# Patient Record
Sex: FEMALE | Race: WHITE | HISPANIC OR LATINO | Employment: FULL TIME | URBAN - METROPOLITAN AREA
[De-identification: names, ages, dates, MRNs, and addresses within clinical notes are randomized per-mention and may not be internally consistent; named-entity substitution may affect disease eponyms.]

---

## 2020-06-26 ENCOUNTER — OCCUPATIONAL HEALTH (OUTPATIENT)
Dept: URGENT CARE | Facility: CLINIC | Age: 30
End: 2020-06-26

## 2020-06-26 DIAGNOSIS — Z02.83 ENCOUNTER FOR DRUG SCREENING: Primary | ICD-10-CM

## 2020-06-26 LAB
CTP QC/QA: YES
POC 10 PANEL DRUG SCREEN: NEGATIVE

## 2020-06-26 PROCEDURE — 80305 POCT RAPID DRUG SCREEN 10 PANEL: ICD-10-PCS | Mod: QW,S$GLB,, | Performed by: PREVENTIVE MEDICINE

## 2020-06-26 PROCEDURE — 80305 DRUG TEST PRSMV DIR OPT OBS: CPT | Mod: QW,S$GLB,, | Performed by: PREVENTIVE MEDICINE

## 2022-06-13 ENCOUNTER — HOSPITAL ENCOUNTER (EMERGENCY)
Facility: HOSPITAL | Age: 32
Discharge: HOME | End: 2022-06-14
Attending: EMERGENCY MEDICINE
Payer: COMMERCIAL

## 2022-06-13 DIAGNOSIS — M25.559 HIP PAIN: ICD-10-CM

## 2022-06-13 DIAGNOSIS — M87.052 AVASCULAR NECROSIS OF BONE OF HIP, LEFT: Primary | ICD-10-CM

## 2022-06-13 LAB
B-HCG UR QL: NEGATIVE
CTP QC/QA: YES

## 2022-06-13 PROCEDURE — 99284 EMERGENCY DEPT VISIT MOD MDM: CPT | Mod: 25

## 2022-06-13 PROCEDURE — 81025 URINE PREGNANCY TEST: CPT | Performed by: NURSE PRACTITIONER

## 2022-06-13 PROCEDURE — 25000003 PHARM REV CODE 250: Performed by: EMERGENCY MEDICINE

## 2022-06-13 PROCEDURE — 99284 PR EMERGENCY DEPT VISIT,LEVEL IV: ICD-10-PCS | Mod: ,,, | Performed by: EMERGENCY MEDICINE

## 2022-06-13 PROCEDURE — 99284 EMERGENCY DEPT VISIT MOD MDM: CPT | Mod: ,,, | Performed by: EMERGENCY MEDICINE

## 2022-06-13 RX ORDER — GABAPENTIN 300 MG/1
300 CAPSULE ORAL
Status: COMPLETED | OUTPATIENT
Start: 2022-06-13 | End: 2022-06-13

## 2022-06-13 RX ORDER — OXYCODONE HYDROCHLORIDE 5 MG/1
5 TABLET ORAL
Status: COMPLETED | OUTPATIENT
Start: 2022-06-13 | End: 2022-06-13

## 2022-06-13 RX ADMIN — OXYCODONE 5 MG: 5 TABLET ORAL at 11:06

## 2022-06-13 RX ADMIN — GABAPENTIN 300 MG: 300 CAPSULE ORAL at 11:06

## 2022-06-13 NOTE — Clinical Note
"Amalia"David Hayes was seen and treated in our emergency department on 6/13/2022.  She may return to work on 06/21/2022.       If you have any questions or concerns, please don't hesitate to call.      Donna Vásquez MD"

## 2022-06-14 VITALS
DIASTOLIC BLOOD PRESSURE: 93 MMHG | OXYGEN SATURATION: 100 % | TEMPERATURE: 98 F | SYSTOLIC BLOOD PRESSURE: 172 MMHG | HEART RATE: 81 BPM | RESPIRATION RATE: 16 BRPM

## 2022-06-14 PROCEDURE — 25000003 PHARM REV CODE 250: Performed by: EMERGENCY MEDICINE

## 2022-06-14 RX ORDER — IBUPROFEN 600 MG/1
600 TABLET ORAL EVERY 6 HOURS PRN
Qty: 20 TABLET | Refills: 0 | Status: SHIPPED | OUTPATIENT
Start: 2022-06-14 | End: 2022-06-19

## 2022-06-14 RX ORDER — IBUPROFEN 600 MG/1
600 TABLET ORAL
Status: COMPLETED | OUTPATIENT
Start: 2022-06-14 | End: 2022-06-14

## 2022-06-14 RX ORDER — OXYCODONE HYDROCHLORIDE 5 MG/1
5 TABLET ORAL
Status: COMPLETED | OUTPATIENT
Start: 2022-06-14 | End: 2022-06-14

## 2022-06-14 RX ORDER — HYDROCODONE BITARTRATE AND ACETAMINOPHEN 5; 325 MG/1; MG/1
1 TABLET ORAL EVERY 6 HOURS PRN
Qty: 12 TABLET | Refills: 0 | Status: SHIPPED | OUTPATIENT
Start: 2022-06-14 | End: 2022-06-17

## 2022-06-14 RX ADMIN — IBUPROFEN 600 MG: 600 TABLET ORAL at 01:06

## 2022-06-14 RX ADMIN — OXYCODONE 5 MG: 5 TABLET ORAL at 01:06

## 2022-06-14 NOTE — ED NOTES
Bed: [facility]  Expected date:   Expected time:   Means of arrival:   Comments:  michelle

## 2022-06-14 NOTE — ED PROVIDER NOTES
Source of History:  Patient  Chart    Chief complaint:  Back Pain (Pt reporting lower back pain that began on Saturday, but has occurred for a couple of months. Pt recently had xrays, and chriopractic appts, and xrays were normal according to pt. )      HPI:  Amalia Hayes is a 31 y.o. female with history of hip surgery as a child presenting to emergency department with complaint of back pain.    Patient states that she has had ongoing pain in her back and hip for approximately 1 year.  She states that she has been seeing a chiropractor, and they have done x-rays of the hip.  She states that despite chiropractor appointments, and physical therapy, she has had ongoing severe pain which does not improved with ibuprofen.    She is able to ambulate with difficulty, more due to pain and weakness.  She does complain of some numbness in the left foot.  No fevers.  No history of IV drug use.  No urinary or stool incontinence.  No saddle anesthesia.    ROS: As per HPI and below:  Review of Systems   Constitutional: Negative for fever.   HENT: Negative for sore throat.    Eyes: Negative for double vision.   Respiratory: Negative for cough and shortness of breath.    Cardiovascular: Negative for chest pain.   Gastrointestinal: Negative for abdominal pain and vomiting.   Genitourinary: Negative for dysuria.   Musculoskeletal: Positive for back pain and joint pain. Negative for falls.   Skin: Negative for rash.   Neurological: Positive for sensory change. Negative for headaches.       Review of patient's allergies indicates:  No Known Allergies    No current facility-administered medications on file prior to encounter.     Current Outpatient Medications on File Prior to Encounter   Medication Sig Dispense Refill    azithromycin (Z-ROSA) 250 MG tablet Take 1 tablet (250 mg total) by mouth once daily. 1 pack, take as directed 6 tablet 0    ergocalciferol (ERGOCALCIFEROL) 50,000 unit Cap Take 1 capsule (50,000 Units total) by  mouth every 7 days. 12 capsule 3    fluticasone (FLONASE) 50 mcg/actuation nasal spray 1 spray (50 mcg total) by Each Nare route once daily. 16 g 1    loratadine (CLARITIN) 10 mg tablet Take 1 tablet (10 mg total) by mouth once daily. 30 tablet 0    topiramate (TOPAMAX) 25 MG tablet Take 1 tablet (25 mg total) by mouth every evening. 30 tablet 1       PMH:  As per HPI and below:  No past medical history on file.  No past surgical history on file.    Social History     Socioeconomic History    Marital status: Single       No family history on file.    Physical Exam:      Vitals:    06/14/22 0134   BP: (!) 172/93   Pulse: 81   Resp: 16   Temp:      Gen:  Tearful, uncomfortable, nontoxic.  Mental Status:  Alert and oriented .  Appropriate, conversant.  Skin: Warm, dry. No rashes seen.  Eyes: No conjunctival injection.  Pulm: CTAB. No increased work of breathing.  No significant tachypnea.  No audible stridor or wheezing.  No conversational dyspnea.    CV: Regular rate. Regular rhythm.   Abd: Soft.  Not distended.  Nontender.   MSK:  Diminished range of motion of the left hip.  Strength exam limited in the left leg secondary to pain.  No tenderness to palpation of the lumbar spine, sacrum.  Neuro: Awake. Speech normal. No focal neuro deficit observed.    Laboratory Studies:  Labs Reviewed   POCT URINE PREGNANCY       X-rays (independently interpreted by me):  Severe degenerative changes of the left hip.  No fracture or dislocation.    Chart reviewed.     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    No acute fractures evident.      Electronically signed by: José Soto  Date:    06/14/2022  Time:    00:36              Narrative:    EXAMINATION:  XR HIP WITH PELVIS WHEN PERFORMED, 2 OR 3 VIEWS LEFT    CLINICAL HISTORY:  Pain in unspecified hip    TECHNIQUE:  AP view of the pelvis and frog leg lateral view of the left hip were performed.    COMPARISON:  None    FINDINGS:  Orthopedic screws in the left iliac and ileum are noted.  Marked degenerative changes in the left femoroacetabular joint with subchondral cystic formation and flattening of the left femoral head suggesting stage III AVN.  Remodeling of the acetabular roof with osteoarthritis changes are noted.  The remainder of the pelvic ring and left hip appear intact.  The lower lumbar spine appears unremarkable.  No discrete acute fracture is evident.                                Medications Given:  Medications   oxyCODONE immediate release tablet 5 mg (5 mg Oral Given 6/13/22 2311)   gabapentin capsule 300 mg (300 mg Oral Given 6/13/22 2311)   oxyCODONE immediate release tablet 5 mg (5 mg Oral Given 6/14/22 0128)   ibuprofen tablet 600 mg (600 mg Oral Given 6/14/22 0128)     MDM:    31 y.o. female with complaint of 1 year of back/hip pain.  Pain is unimproved after chiropractic treatments and physical therapy.  No clear evidence of cauda equina syndrome given her lack of saddle anesthesia, urinary or stool incontinence, leg weakness.  She has some numbness of that leg which is subacute.    X-ray demonstrates severe degenerative changes of the left hip, for which she will need orthopedic surgery follow-up.  Pain improved here with medications.  Referral placed for Ortho.  Discharged home in stable condition.  Return precautions discussed at bedside.    Diagnostic Impression:    1. Avascular necrosis of bone of hip, left    2. Hip pain    3. Hip pain         ED Disposition Condition    Discharge Stable        ED Prescriptions     Medication Sig Dispense Start Date End Date Auth. Provider    ibuprofen (ADVIL,MOTRIN) 600 MG tablet Take 1 tablet (600 mg total) by mouth every 6  (six) hours as needed for Pain. 20 tablet 2022 Donna Vásquez MD    HYDROcodone-acetaminophen (NORCO) 5-325 mg per tablet () Take 1 tablet by mouth every 6 (six) hours as needed for Pain. 12 tablet 2022 Donna Vásquez MD        Follow-up Information     Follow up With Specialties Details Why Contact Info Additional Information    Elian Collier MD Family Medicine Schedule an appointment as soon as possible for a visit   [address] [facility] [address] [phone]       [facility] Schedule an appointment as soon as possible for a visit   [address]  [facility] [address] [phone] [facility] - Main Building, 5th Floor Please park in [address] and take Atrium elevator          Patient understands the plan and is in agreement, verbalized understanding, questions answered    Donna Vásquez MD  Emergency Medicine       Donna Vásquez MD  [phone]

## 2022-06-14 NOTE — DISCHARGE INSTRUCTIONS
Diagnosis: Avascular necrosis of the hip    A referral was placed for Orthopedic surgery.  If they do not call you to set up a follow-up appointment, please call them.    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      No acute fractures evident.         Electronically signed by: José Soto   Date:    06/14/2022   Time:    00:36          Treatments you had today:   Medications   oxyCODONE immediate release tablet 5 mg (5 mg Oral Given 6/13/22 2311)   gabapentin capsule 300 mg (300 mg Oral Given 6/13/22 2311)       Follow-Up Plan:  - Follow-up with primary care doctor within 3 - 5 days  - Additional testing and/or evaluation as directed by your primary doctor  - Follow up with orthopedic surgery    Take ibuprofen (also called Advil, Motrin) for your pain. This medicine is available over-the-counter in 200 mg tablets.  - You may take 600 mg every 6 hours, or 800 mg every 8 hours as needed   - Do not take more than this amount, as it can cause kidney problems, bleeding in your stomach, and other serious problems.   - Do not also take naproxen (Aleve) at the same time or on the same day  - If you have heart problems or uncontrolled high blood pressure, you should not take ibuprofen for more than 3 days without discussing with your doctor    If your pain is not controlled with ibuprofen, you may also take Norco (acetaminophen-hydrocodone).  - Take this medication only when needed for breakthrough pain.   - Do not take more than the prescribed amount to control your pain.  - Do not operate machinery, drive, exercise, perform caregiving, make important decisions or perform important tasks while taking Norco. It can make you drowsy or forgetful.  - Norco is addictive in as little as 3 days, so take only as  needed.  - Norco can dangerously slow or stop your breathing if you take too much, or if you combine it with alcohol or sedating medicines (including Xanex, Ativan) or illegal drugs.   - Norco can make you constipated (hard to poop), so take fiber supplements and a stool softener while taking this medicine.   - This medication contains acetaminophen (Tylenol). Each pill has 325 mg of acetaminophen. Do not take more than 4,000 mg of acetaminophen within 24 hours as this may lead to liver damage.'  Return to the Emergency Department for symptoms including but not limited to: worsening symptoms, shortness of breath or chest pain, vomiting with inability to hold down fluids, fevers greater than 100.4°F, passing out/fainting/unconsciousness, or other concerning symptoms.

## 2022-06-14 NOTE — FIRST PROVIDER EVALUATION
Emergency Department TeleTriage Encounter Note      CHIEF COMPLAINT    Chief Complaint   Patient presents with    Back Pain     Pt reporting lower back pain that began on Saturday, but has occurred for a couple of months. Pt recently had xrays, and chriopractic appts, and xrays were normal according to pt.        VITAL SIGNS   Initial Vitals [06/13/22 2119]   BP Pulse Resp Temp SpO2   (!) 166/90 93 18 98 °F (36.7 °C) 98 %      MAP       --            ALLERGIES    Review of patient's allergies indicates:  No Known Allergies    PROVIDER TRIAGE NOTE  This is a teletriage evaluation of a 31 y.o. female presenting to the ED with c/o back pain x 1 yr. Now with significant increase in pain since sat.   Denies loss of bowel/ bladder control.      ROS: (-) fever, (-) rash  PE:  NAD    Initial orders will be placed and care will be transferred to an alternate provider when patient is roomed for a full evaluation. Any additional orders and the final disposition will be determined by that provider.         ORDERS  Labs Reviewed - No data to display    ED Orders (720h ago, onward)    Start Ordered     Status Ordering Provider    06/13/22 2203 06/13/22 2202  POCT urine pregnancy  Once         Ordered YUNIER RAMIRES            Virtual Visit Note: The provider triage portion of this emergency department evaluation and documentation was performed via "[employer] a HIPAA-compliant telemedicine application, in concert with a tele-presenter in the room. A face to face patient evaluation with one of my colleagues will occur once the patient is placed in an emergency department room.      DISCLAIMER: This note was prepared with [employer] voice recognition transcription software. Garbled syntax, mangled pronouns, and other bizarre constructions may be attributed to that software system.

## 2022-08-25 ENCOUNTER — TELEPHONE (OUTPATIENT)
Dept: INTERNAL MEDICINE | Facility: CLINIC | Age: 32
End: 2022-08-25
Payer: COMMERCIAL

## 2022-08-25 ENCOUNTER — OFFICE VISIT (OUTPATIENT)
Dept: INTERNAL MEDICINE | Facility: CLINIC | Age: 32
End: 2022-08-25
Payer: COMMERCIAL

## 2022-08-25 ENCOUNTER — LAB VISIT (OUTPATIENT)
Dept: LAB | Facility: HOSPITAL | Age: 32
End: 2022-08-25
Attending: INTERNAL MEDICINE
Payer: COMMERCIAL

## 2022-08-25 VITALS
WEIGHT: 247.44 LBS | OXYGEN SATURATION: 98 % | HEART RATE: 82 BPM | TEMPERATURE: 98 F | SYSTOLIC BLOOD PRESSURE: 120 MMHG | DIASTOLIC BLOOD PRESSURE: 76 MMHG | HEIGHT: 63 IN | BODY MASS INDEX: 43.84 KG/M2

## 2022-08-25 DIAGNOSIS — J30.9 ALLERGIC RHINITIS, UNSPECIFIED SEASONALITY, UNSPECIFIED TRIGGER: ICD-10-CM

## 2022-08-25 DIAGNOSIS — Z11.59 SCREENING FOR VIRAL DISEASE: ICD-10-CM

## 2022-08-25 DIAGNOSIS — Z00.00 ANNUAL PHYSICAL EXAM: Primary | ICD-10-CM

## 2022-08-25 DIAGNOSIS — Z11.4 SCREENING FOR HIV (HUMAN IMMUNODEFICIENCY VIRUS): ICD-10-CM

## 2022-08-25 DIAGNOSIS — Z23 NEED FOR VACCINATION: ICD-10-CM

## 2022-08-25 DIAGNOSIS — M87.052 AVASCULAR NECROSIS OF LEFT FEMUR: ICD-10-CM

## 2022-08-25 DIAGNOSIS — M87.052 AVASCULAR NECROSIS OF LEFT FEMUR: Primary | ICD-10-CM

## 2022-08-25 DIAGNOSIS — Z00.00 ANNUAL PHYSICAL EXAM: ICD-10-CM

## 2022-08-25 LAB
ALBUMIN SERPL BCP-MCNC: 4 G/DL (ref 3.5–5.2)
ALP SERPL-CCNC: 59 U/L (ref 55–135)
ALT SERPL W/O P-5'-P-CCNC: 18 U/L (ref 10–44)
ANION GAP SERPL CALC-SCNC: 10 MMOL/L (ref 8–16)
AST SERPL-CCNC: 15 U/L (ref 10–40)
BASOPHILS # BLD AUTO: 0.06 K/UL (ref 0–0.2)
BASOPHILS NFR BLD: 0.7 % (ref 0–1.9)
BILIRUB SERPL-MCNC: 0.5 MG/DL (ref 0.1–1)
BUN SERPL-MCNC: 14 MG/DL (ref 6–20)
CALCIUM SERPL-MCNC: 9.9 MG/DL (ref 8.7–10.5)
CHLORIDE SERPL-SCNC: 109 MMOL/L (ref 95–110)
CHOLEST SERPL-MCNC: 161 MG/DL (ref 120–199)
CHOLEST/HDLC SERPL: 4.2 {RATIO} (ref 2–5)
CO2 SERPL-SCNC: 26 MMOL/L (ref 23–29)
CREAT SERPL-MCNC: 0.8 MG/DL (ref 0.5–1.4)
DIFFERENTIAL METHOD: ABNORMAL
EOSINOPHIL # BLD AUTO: 0.1 K/UL (ref 0–0.5)
EOSINOPHIL NFR BLD: 1.2 % (ref 0–8)
ERYTHROCYTE [DISTWIDTH] IN BLOOD BY AUTOMATED COUNT: 12.9 % (ref 11.5–14.5)
EST. GFR  (NO RACE VARIABLE): >60 ML/MIN/1.73 M^2
GLUCOSE SERPL-MCNC: 109 MG/DL (ref 70–110)
HCT VFR BLD AUTO: 44.2 % (ref 37–48.5)
HDLC SERPL-MCNC: 38 MG/DL (ref 40–75)
HDLC SERPL: 23.6 % (ref 20–50)
HGB BLD-MCNC: 13.6 G/DL (ref 12–16)
IMM GRANULOCYTES # BLD AUTO: 0.03 K/UL (ref 0–0.04)
IMM GRANULOCYTES NFR BLD AUTO: 0.3 % (ref 0–0.5)
LDLC SERPL CALC-MCNC: 106 MG/DL (ref 63–159)
LYMPHOCYTES # BLD AUTO: 3.1 K/UL (ref 1–4.8)
LYMPHOCYTES NFR BLD: 34.7 % (ref 18–48)
MCH RBC QN AUTO: 27.9 PG (ref 27–31)
MCHC RBC AUTO-ENTMCNC: 30.8 G/DL (ref 32–36)
MCV RBC AUTO: 91 FL (ref 82–98)
MONOCYTES # BLD AUTO: 0.5 K/UL (ref 0.3–1)
MONOCYTES NFR BLD: 5.2 % (ref 4–15)
NEUTROPHILS # BLD AUTO: 5.2 K/UL (ref 1.8–7.7)
NEUTROPHILS NFR BLD: 57.9 % (ref 38–73)
NONHDLC SERPL-MCNC: 123 MG/DL
NRBC BLD-RTO: 0 /100 WBC
PLATELET # BLD AUTO: 368 K/UL (ref 150–450)
PMV BLD AUTO: 11.1 FL (ref 9.2–12.9)
POTASSIUM SERPL-SCNC: 4.1 MMOL/L (ref 3.5–5.1)
PROT SERPL-MCNC: 7.6 G/DL (ref 6–8.4)
RBC # BLD AUTO: 4.87 M/UL (ref 4–5.4)
SODIUM SERPL-SCNC: 145 MMOL/L (ref 136–145)
T4 FREE SERPL-MCNC: 1.02 NG/DL (ref 0.71–1.51)
TRIGL SERPL-MCNC: 85 MG/DL (ref 30–150)
TSH SERPL DL<=0.005 MIU/L-ACNC: 1.92 UIU/ML (ref 0.4–4)
WBC # BLD AUTO: 9.01 K/UL (ref 3.9–12.7)

## 2022-08-25 PROCEDURE — 85025 COMPLETE CBC W/AUTO DIFF WBC: CPT | Performed by: INTERNAL MEDICINE

## 2022-08-25 PROCEDURE — 99999 PR PBB SHADOW E&M-EST. PATIENT-LVL V: ICD-10-PCS | Mod: PBBFAC,,, | Performed by: INTERNAL MEDICINE

## 2022-08-25 PROCEDURE — 99395 PR PREVENTIVE VISIT,EST,18-39: ICD-10-PCS | Mod: 25,S$GLB,, | Performed by: INTERNAL MEDICINE

## 2022-08-25 PROCEDURE — 99999 PR PBB SHADOW E&M-EST. PATIENT-LVL V: CPT | Mod: PBBFAC,,, | Performed by: INTERNAL MEDICINE

## 2022-08-25 PROCEDURE — 84443 ASSAY THYROID STIM HORMONE: CPT | Performed by: INTERNAL MEDICINE

## 2022-08-25 PROCEDURE — 90715 TDAP VACCINE 7 YRS/> IM: CPT | Mod: S$GLB,,, | Performed by: INTERNAL MEDICINE

## 2022-08-25 PROCEDURE — 3074F SYST BP LT 130 MM HG: CPT | Mod: CPTII,S$GLB,, | Performed by: INTERNAL MEDICINE

## 2022-08-25 PROCEDURE — 80053 COMPREHEN METABOLIC PANEL: CPT | Performed by: INTERNAL MEDICINE

## 2022-08-25 PROCEDURE — 1159F PR MEDICATION LIST DOCUMENTED IN MEDICAL RECORD: ICD-10-PCS | Mod: CPTII,S$GLB,, | Performed by: INTERNAL MEDICINE

## 2022-08-25 PROCEDURE — 87389 HIV-1 AG W/HIV-1&-2 AB AG IA: CPT | Performed by: INTERNAL MEDICINE

## 2022-08-25 PROCEDURE — 3078F DIAST BP <80 MM HG: CPT | Mod: CPTII,S$GLB,, | Performed by: INTERNAL MEDICINE

## 2022-08-25 PROCEDURE — 86803 HEPATITIS C AB TEST: CPT | Performed by: INTERNAL MEDICINE

## 2022-08-25 PROCEDURE — 99395 PREV VISIT EST AGE 18-39: CPT | Mod: 25,S$GLB,, | Performed by: INTERNAL MEDICINE

## 2022-08-25 PROCEDURE — 90715 TDAP VACCINE GREATER THAN OR EQUAL TO 7YO IM: ICD-10-PCS | Mod: S$GLB,,, | Performed by: INTERNAL MEDICINE

## 2022-08-25 PROCEDURE — 90471 TDAP VACCINE GREATER THAN OR EQUAL TO 7YO IM: ICD-10-PCS | Mod: S$GLB,,, | Performed by: INTERNAL MEDICINE

## 2022-08-25 PROCEDURE — 3008F BODY MASS INDEX DOCD: CPT | Mod: CPTII,S$GLB,, | Performed by: INTERNAL MEDICINE

## 2022-08-25 PROCEDURE — 36415 COLL VENOUS BLD VENIPUNCTURE: CPT | Performed by: INTERNAL MEDICINE

## 2022-08-25 PROCEDURE — 3008F PR BODY MASS INDEX (BMI) DOCUMENTED: ICD-10-PCS | Mod: CPTII,S$GLB,, | Performed by: INTERNAL MEDICINE

## 2022-08-25 PROCEDURE — 3074F PR MOST RECENT SYSTOLIC BLOOD PRESSURE < 130 MM HG: ICD-10-PCS | Mod: CPTII,S$GLB,, | Performed by: INTERNAL MEDICINE

## 2022-08-25 PROCEDURE — 1159F MED LIST DOCD IN RCRD: CPT | Mod: CPTII,S$GLB,, | Performed by: INTERNAL MEDICINE

## 2022-08-25 PROCEDURE — 90471 IMMUNIZATION ADMIN: CPT | Mod: S$GLB,,, | Performed by: INTERNAL MEDICINE

## 2022-08-25 PROCEDURE — 3078F PR MOST RECENT DIASTOLIC BLOOD PRESSURE < 80 MM HG: ICD-10-PCS | Mod: CPTII,S$GLB,, | Performed by: INTERNAL MEDICINE

## 2022-08-25 PROCEDURE — 80061 LIPID PANEL: CPT | Performed by: INTERNAL MEDICINE

## 2022-08-25 PROCEDURE — 84439 ASSAY OF FREE THYROXINE: CPT | Performed by: INTERNAL MEDICINE

## 2022-08-25 RX ORDER — FLUTICASONE PROPIONATE 50 MCG
1 SPRAY, SUSPENSION (ML) NASAL DAILY
Qty: 16 G | Refills: 1 | Status: SHIPPED | OUTPATIENT
Start: 2022-08-25

## 2022-08-25 NOTE — LETTER
August 25, 2022      [facility] [address]  [address]  [facility] [address]  Phone: [phone]  Fax: [phone]       Patient: Amalia Hayes   YOB: 1990  Date of Visit: 08/25/2022    To Whom It May Concern:    Vicenta Hayes  was at [facility] on 08/25/2022. The patient may return to work on 8/29/2022 with no restrictions. If you have any questions or concerns, or if I can be of further assistance, please do not hesitate to contact me.    Sincerely,         José Sawyer MD

## 2022-08-25 NOTE — PROGRESS NOTES
"  [facility] Note    Chief Complaint      Chief Complaint   Patient presents with    Establish Care    Cough     When talking a lot    Hip Pain     left       History of Present Illness      Amalia Hayes is a 32 y.o. female with chronic conditions of  who presents today for: establish care and annual preventative visit.  Reports 3 years of left hip pain.  Pain with prolonged walking.  Pain is limiting ADLs.  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  Diet: Prepares own food mostly.  Limiting fatty foods and carbs.  Drinks too many sugary drinks (soft drinks and sweet tea).    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    Denies drinking and driving, drinking more than 4 drinks on occasion, drug use.     Flu shot discussed.  TdAP 2007, due for repeat.  COVID vaccine discussed.  Shingrix due age 51.  Pneumonia vaccine due age 66.  PAP smear due.  Mammogram due age 41.  DEXA due age 66.  Cscope due age 46.    Past Medical History:  History reviewed. No pertinent past medical history.    Past Surgical History:   has a past surgical history that includes Hip surgery (Left).    Family History:  family history includes No Known Problems in her father and mother.     Social History:  Social History     Tobacco Use    Smoking status: Never Smoker    Smokeless tobacco: Never Used   Substance Use Topics    Alcohol use: Never    Drug use: Never       I personally reviewed all past medical, surgical, social and family history.    Review of Systems   Constitutional: Negative for chills, fever and malaise/fatigue.   Respiratory: Negative for shortness of breath.    Cardiovascular: Negative for chest pain.   Gastrointestinal: Negative for constipation, diarrhea, nausea and vomiting.   Skin: Negative for rash.   Neurological: Negative for weakness.   All " "other systems reviewed and are negative.       Medications:  Outpatient Encounter Medications as of 8/25/2022   Medication Sig Dispense Refill    azithromycin (Z-ROSA) 250 MG tablet Take 1 tablet (250 mg total) by mouth once daily. 1 pack, take as directed 6 tablet 0    ergocalciferol (ERGOCALCIFEROL) 50,000 unit Cap Take 1 capsule (50,000 Units total) by mouth every 7 days. 12 capsule 3    fluticasone propionate (FLONASE) 50 mcg/actuation nasal spray 1 spray (50 mcg total) by Each Nostril route once daily. 16 g 1    loratadine (CLARITIN) 10 mg tablet Take 1 tablet (10 mg total) by mouth once daily. 30 tablet 0    topiramate (TOPAMAX) 25 MG tablet Take 1 tablet (25 mg total) by mouth every evening. 30 tablet 1    [DISCONTINUED] fluticasone (FLONASE) 50 mcg/actuation nasal spray 1 spray (50 mcg total) by Each Nare route once daily. 16 g 1     No facility-administered encounter medications on file as of 8/25/2022.       Allergies:  Review of patient's allergies indicates:  No Known Allergies    Health Maintenance:  Immunization History   Administered Date(s) Administered    HPV Quadrivalent 02/01/2007, 05/03/2007    Hepatitis B 09/27/2002, 02/01/2007    Hepatitis B, Pediatric/Adolescent 05/03/2007    IPV 05/03/2007    MMR 09/27/2002, 05/03/2007    Meningococcal Conjugate (MCV4P) 02/01/2007    Td (ADULT) 09/27/2002    Tdap 05/03/2007      Health Maintenance   Topic Date Due    TETANUS VACCINE  05/03/2017    Hepatitis C Screening  Completed    Lipid Panel  Completed        Physical Exam      Vital Signs  Temp: 98.4 °F (36.9 °C)  Temp src: Oral  Pulse: 82  SpO2: 98 %  BP: 120/76  BP Location: Left arm  Patient Position: Sitting  Pain Score: 0-No pain  Height and Weight  Height: 5' 3" (160 cm)  Weight: 112.2 kg (247 lb 7.5 oz)  BSA (Calculated - sq m): 2.23 sq meters  BMI (Calculated): 43.8  Weight in (lb) to have BMI = 25: 140.8]    Physical Exam  Vitals reviewed.   Constitutional:       Appearance: She " is well-developed.   HENT:      Head: Normocephalic and atraumatic.      Right Ear: External ear normal.      Left Ear: External ear normal.   Cardiovascular:      Rate and Rhythm: Normal rate and regular rhythm.      Heart sounds: Normal heart sounds. No murmur heard.  Pulmonary:      Effort: Pulmonary effort is normal.      Breath sounds: Normal breath sounds. No wheezing or rales.   Abdominal:      General: Bowel sounds are normal. There is no distension.      Palpations: Abdomen is soft.      Tenderness: There is no abdominal tenderness.          Laboratory:  CBC:      CMP:        Invalid input(s): CREATININ  URINALYSIS:       LIPIDS:      TSH:      A1C:        Assessment/Plan     Amalia Hayes is a 32 y.o.female with:    1. Annual physical exam  - CBC Auto Differential; Future  - Comprehensive Metabolic Panel; Future  - Lipid Panel; Future  - TSH; Future  - T4, Free; Future  - Hepatitis C Antibody; Future  - HIV 1/2 Ag/Ab (4th Gen); Future  - Ambulatory referral/consult to Obstetrics / Gynecology; Future  2. Screening for viral disease  - Hepatitis C Antibody; Future  3. Screening for HIV (human immunodeficiency virus)  - HIV 1/2 Ag/Ab (4th Gen); Future  Discussed diet and exercise, vaccines and cancer screening, risk factors.  Screening labs ordered.     4. Avascular necrosis of left femur  - Ambulatory referral/consult to Orthopedics; Future  Referring to ortho.  5. Need for vaccination  - Tdap Vaccine    6. Allergic rhinitis, unspecified seasonality, unspecified trigger  - fluticasone propionate (FLONASE) 50 mcg/actuation nasal spray; 1 spray (50 mcg total) by Each Nostril route once daily.  Dispense: 16 g; Refill: 1       Chronic conditions status updated as per HPI.  Other than changes above, cont current medications and maintain follow up with specialists.  Follow up in about 1 year (around 8/25/2023) for Annual preventative visit.    No future appointments.    José Sawyer MD  [facility]

## 2022-08-25 NOTE — TELEPHONE ENCOUNTER
Please let pt know I spoke with Dr. Iglesias and he is recommend Dr. Burdick, Dr. Warren or Dr. Rios.  I adjusted the referral.  Please cancel the appt with Dr. Iglesias and assist with scheduling with Dr. Ramsey Burdick.

## 2022-08-26 LAB
HCV AB SERPL QL IA: NEGATIVE
HIV 1+2 AB+HIV1 P24 AG SERPL QL IA: NEGATIVE

## 2022-09-21 NOTE — PROGRESS NOTES
Subjective:     HPI:   Amalia Hayes is a 32 y.o. female who presents for a 2nd opinion re: her left hip pain    She says that she has been told she was born with both hips dislocated in [facility]. She underwent a surgery on both hips as an infant in [facility], she has lateral incisions over both hips. She is unsure if she was casted or any details of these procedures.     She had progressive left hip pain over the next decade and her family moved to the [place].     She underwent a HALIE in () at [facility] -  The patient reported no issues with his inicision healing and no complications with infection after surgery. The patient spent 7 night(s) in the hospital. The patient did OP-PT after surgery. The patient stated that she healed well since surgery.   2 incisions: Ant window ilioinguinal + small incision in the medial buttock ?perc screw    She says this surgery helped and she did well and was able to play soccer.  However over the past 3 years with the pandemic she has decreased her activities and has gained a significant amount of weight.  She reports a 100 pound weight loss over the past few years.    Along with this increasing weight has come increasing left hip pain.  She complains of moderate to severe activity-related lateral and buttock pain no specific anterior hip pain some occasional anterior thigh pain down to the knee occasional radiation down to the foot    She recently saw Dr. Saravia who discussed getting a CT scan for pre-op planning but says it was never scheduled so she is seeking a 2nd opinion    Medications: Aleve    Injections: None    Physical Therapy: Yes, completed OP-PT in 2022, the patient reported that the OP-PT helped with the pain, but when she stopped PT, the pain came back.     Assistive Devices: None     Walkin mile    Limitations: General walking, difficulty going up/down steps, difficulty getting in/out of the car, difficulty rising from sitting , and  difficulty standing for long periods of time      Occupation: The patient currently works as a  for [employer] (in [place])     Social support: The patient stated that they live at home alone. The patient stated that her grandmother would have to come into town to help take care of her. The patient said that her father is in the area but works.        ROS:  The updated medical history is in the chart and has been reviewed. A review of systems is updated and there is no reported vision changes, ear/nose/mouth/throat complaints,  chest pain, shortness of breath, abdominal pain, urological complaints, fevers or chills, psychiatric complaints. Musculoskeletal and neurologcial symptoms are as documented. All other systems are negative.      Objective:   Exam:  There were no vitals filed for this visit.  Body mass index is 43.64 kg/m².    Physical examination included assessment of the patient's general appearance with particular attention to development, nutrition, body habitus, attention to grooming, and any evidence of distress.  Constitutional: The patient is a well-developed, well-nourished patient in no acute distress.   Cardiovascular: Vascular examination included warmth and capillary refill as well inspection for edema and assessment of pedal pulses. Pulses are palpable and regular.  Musculoskeletal: Gait was assessed as to whether it was steady, non-antalgic, and/or required the use of an assist device. The patient was also asked to walk independently and get onto the examination table.  Skin: The skin was examined for any obvious rashes or lesions in the extremity.  Neurologic: Sensation is intact to light touch in the extremity. The patient has good coordination without hyperreflexia and is alert and oriented to person, place and time and has normal mood and affect.     All of the above were examined and found to be within normal limits except for the following pertinent clinical  findings:    Slight limp nonantalgic gait negative Trendelenburg on the left.  She can do a single leg stance with mild discomfort, no significant tenderness over the greater trochanter and good abductor strength.  She has groin pain with active straight leg raise 0-70 hip flexion 20 abduction 20 abduction 20 external 10 internal rotation which recreates her symptoms.  Skin is intact to the anterior hip and lateral hip.  No significant limb length discrepancy supine she does not notice a difference.    She has a well-healed direct lateral incision which she says is the surgery when she was an infant and [facility], she has well-healed medial window anterior ileal inguinal incision and a small perc incision in her medial buttock all well-healed      Imaging:    HIP L ARTHRITIS         Indication:  Left hip pain  Exam Ordered: Radiographs include an anteroposterior pelvis, an anteroposterior and lateral view of the proximal femur including the hip joint.  Details of Examination: Exam shows evidence of joint space narrowing, osteophyte formation, and subchondral sclerosis, all consistent with degenerative arthritis of the hip.  No other significant findings are noted.  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 Left hip pain  M25.552 719.45      B hip dsyplasia, initial lateral surgery in [facility] as an infant, L HALIE at [facility] 2003  Severe L hip arthritis    BMI 43, 100 pound weight loss over the past 3 years with decreased activity     Plan:       The above findings were discussed with patient length. We discussed the risks of conservative  versus surgical management of the patients hip arthritis. Conservative management consisting of anti-inflammatory medications, weight loss, physical therapy, and activity modification was discussed at length. At this point considering the patient's level of mobility I recommend XIOMY when medically optimized    CT pelvis for pre-op planning      We discussed that it would be ideal if she could lose a signficant amount of weight prior to XIOMY surgery to help minimize her surgical risk and   -RX for Aquatic PT for attempts at therapy to help hip and weight loss  -ref to bariatric medicine  -ref to medical fitness  -recommend she contact her PCP for medical workup for rapid weight gain, will send him a message    F/u after CT, repeat BMI    Orders Placed This Encounter   Procedures    CT Pelvis Without Contrast     Standing Status:   Future     Standing Expiration Date:   9/22/2023     Order Specific Question:   Oral/Rectal Contrast instructions:     Answer:   NO Oral Contrast     Order Specific Question:   May the Radiologist modify the order per protocol to meet the clinical needs of the patient?     Answer:   Yes    Ambulatory referral/consult to Bariatric Medicine     Standing Status:   Future     Standing Expiration Date:   10/22/2023     Referral Priority:   Routine     Referral Type:   Consultation     Referral Reason:   Specialty Services Required     Requested Specialty:   Bariatrics     Number of Visits Requested:   1    Ambulatory referral/consult to Medical Fitness (MEDFIT)     Standing Status:   Future     Standing Expiration Date:   10/22/2023     Referral Priority:   Routine     Referral Type:   Consultation     Number of Visits Requested:   1    Ambulatory referral/consult to Physical/Occupational Therapy     Standing Status:   Future     Standing Expiration Date:   10/22/2023     Referral Priority:   Routine     Referral Type:   Physical Medicine     Referral Reason:   Specialty Services Required      Number of Visits Requested:   1    [facility] Patient Entered [facility] ([facility])             No past medical history on file.    Past Surgical History:   Procedure Laterality Date    HIP SURGERY Left        Family History   Problem Relation Age of Onset    No Known Problems Father     No Known Problems Mother     Breast cancer Neg Hx     Colon cancer Neg Hx     Diabetes Neg Hx     Eclampsia Neg Hx     Hypertension Neg Hx     Miscarriages / Stillbirths Neg Hx     Ovarian cancer Neg Hx      labor Neg Hx     Stroke Neg Hx     Cancer Neg Hx        Social History     Socioeconomic History    Marital status: Single   Tobacco Use    Smoking status: Never    Smokeless tobacco: Never   Substance and Sexual Activity    Alcohol use: Never    Drug use: Never    Sexual activity: Not Currently     Birth control/protection: None     Comment: NOT ACTIVE IN 10 YEARS

## 2022-09-22 ENCOUNTER — OFFICE VISIT (OUTPATIENT)
Dept: ORTHOPEDICS | Facility: CLINIC | Age: 32
End: 2022-09-22
Payer: COMMERCIAL

## 2022-09-22 ENCOUNTER — OFFICE VISIT (OUTPATIENT)
Dept: OBSTETRICS AND GYNECOLOGY | Facility: CLINIC | Age: 32
End: 2022-09-22
Payer: COMMERCIAL

## 2022-09-22 ENCOUNTER — PATIENT MESSAGE (OUTPATIENT)
Dept: ORTHOPEDICS | Facility: CLINIC | Age: 32
End: 2022-09-22

## 2022-09-22 VITALS — BODY MASS INDEX: 43.66 KG/M2 | HEIGHT: 63 IN | WEIGHT: 246.38 LBS

## 2022-09-22 VITALS
WEIGHT: 247.13 LBS | BODY MASS INDEX: 43.79 KG/M2 | HEIGHT: 63 IN | SYSTOLIC BLOOD PRESSURE: 122 MMHG | DIASTOLIC BLOOD PRESSURE: 64 MMHG

## 2022-09-22 DIAGNOSIS — Z01.419 WELL WOMAN EXAM WITH ROUTINE GYNECOLOGICAL EXAM: Primary | ICD-10-CM

## 2022-09-22 DIAGNOSIS — Z12.4 SCREENING FOR CERVICAL CANCER: ICD-10-CM

## 2022-09-22 DIAGNOSIS — M25.552 LEFT HIP PAIN: ICD-10-CM

## 2022-09-22 DIAGNOSIS — M16.32 OSTEOARTHRITIS RESULTING FROM LEFT HIP DYSPLASIA: Primary | ICD-10-CM

## 2022-09-22 PROCEDURE — 1159F PR MEDICATION LIST DOCUMENTED IN MEDICAL RECORD: ICD-10-PCS | Mod: CPTII,S$GLB,, | Performed by: OBSTETRICS & GYNECOLOGY

## 2022-09-22 PROCEDURE — 99999 PR PBB SHADOW E&M-EST. PATIENT-LVL III: ICD-10-PCS | Mod: PBBFAC,,, | Performed by: OBSTETRICS & GYNECOLOGY

## 2022-09-22 PROCEDURE — 99385 PREV VISIT NEW AGE 18-39: CPT | Mod: S$GLB,,, | Performed by: OBSTETRICS & GYNECOLOGY

## 2022-09-22 PROCEDURE — 3078F PR MOST RECENT DIASTOLIC BLOOD PRESSURE < 80 MM HG: ICD-10-PCS | Mod: CPTII,S$GLB,, | Performed by: OBSTETRICS & GYNECOLOGY

## 2022-09-22 PROCEDURE — 87624 HPV HI-RISK TYP POOLED RSLT: CPT | Performed by: OBSTETRICS & GYNECOLOGY

## 2022-09-22 PROCEDURE — 3078F DIAST BP <80 MM HG: CPT | Mod: CPTII,S$GLB,, | Performed by: OBSTETRICS & GYNECOLOGY

## 2022-09-22 PROCEDURE — 3074F PR MOST RECENT SYSTOLIC BLOOD PRESSURE < 130 MM HG: ICD-10-PCS | Mod: CPTII,S$GLB,, | Performed by: OBSTETRICS & GYNECOLOGY

## 2022-09-22 PROCEDURE — 3074F SYST BP LT 130 MM HG: CPT | Mod: CPTII,S$GLB,, | Performed by: OBSTETRICS & GYNECOLOGY

## 2022-09-22 PROCEDURE — 3008F BODY MASS INDEX DOCD: CPT | Mod: CPTII,S$GLB,, | Performed by: OBSTETRICS & GYNECOLOGY

## 2022-09-22 PROCEDURE — 3008F PR BODY MASS INDEX (BMI) DOCUMENTED: ICD-10-PCS | Mod: CPTII,S$GLB,, | Performed by: OBSTETRICS & GYNECOLOGY

## 2022-09-22 PROCEDURE — 99204 OFFICE O/P NEW MOD 45 MIN: CPT | Mod: S$GLB,,, | Performed by: ORTHOPAEDIC SURGERY

## 2022-09-22 PROCEDURE — 99999 PR PBB SHADOW E&M-EST. PATIENT-LVL III: CPT | Mod: PBBFAC,,, | Performed by: OBSTETRICS & GYNECOLOGY

## 2022-09-22 PROCEDURE — 99385 PR PREVENTIVE VISIT,NEW,18-39: ICD-10-PCS | Mod: S$GLB,,, | Performed by: OBSTETRICS & GYNECOLOGY

## 2022-09-22 PROCEDURE — 99999 PR PBB SHADOW E&M-EST. PATIENT-LVL III: CPT | Mod: PBBFAC,,, | Performed by: ORTHOPAEDIC SURGERY

## 2022-09-22 PROCEDURE — 3008F PR BODY MASS INDEX (BMI) DOCUMENTED: ICD-10-PCS | Mod: CPTII,S$GLB,, | Performed by: ORTHOPAEDIC SURGERY

## 2022-09-22 PROCEDURE — 3008F BODY MASS INDEX DOCD: CPT | Mod: CPTII,S$GLB,, | Performed by: ORTHOPAEDIC SURGERY

## 2022-09-22 PROCEDURE — 99999 PR PBB SHADOW E&M-EST. PATIENT-LVL III: ICD-10-PCS | Mod: PBBFAC,,, | Performed by: ORTHOPAEDIC SURGERY

## 2022-09-22 PROCEDURE — 88175 CYTOPATH C/V AUTO FLUID REDO: CPT | Performed by: OBSTETRICS & GYNECOLOGY

## 2022-09-22 PROCEDURE — 1159F MED LIST DOCD IN RCRD: CPT | Mod: CPTII,S$GLB,, | Performed by: OBSTETRICS & GYNECOLOGY

## 2022-09-22 PROCEDURE — 99204 PR OFFICE/OUTPT VISIT, NEW, LEVL IV, 45-59 MIN: ICD-10-PCS | Mod: S$GLB,,, | Performed by: ORTHOPAEDIC SURGERY

## 2022-09-22 NOTE — PROGRESS NOTES
"[facility]  Ambulatory Clinic  Obstetrics & Gynecology    Visit Date:  9/22/2022    Chief Complaint:  Annual GYN exam    History of Present Illness:      Amalia Hayes is a 32 y.o. G0, new pt to me, here for a gynecologic exam.      Pt has no major complaints today.      Menses are regular, not heavy or painful.    Pt current method of family planning is natural family planning, and reports no problems with this method.      Pt denies h/o abnormal pap.    Pt denies active sexually transmitted infections.    Pt performs monthly self breast examination, non-smoker, uses seat belts, and denies abuse.     Pt denies abnormal vaginal bleeding, vaginal discharge, dysmenorrhea, dyspareunia, pelvic pain, bloating, early satiety, unintentional weight loss, breast mass/skin changes, incontinence, GI or urinary complaints.      Otherwise, the pt is in her usual state of health.    Past History:  Gynecologic history as noted above.    Review of Systems:      GENERAL:  No fever, fatigue, excessive weight gain or loss  HEENT:  No headaches, hearing changes, visual disturbance  RESPIRATORY:  No cough, shortness of breath  CARDIOVASCULAR:  No chest pain, heart palpitations, leg swelling  BREAST:  No lump, pain, nipple discharge, skin changes  GASTROINTESTINAL:  No nausea, vomiting, constipation, diarrhea, abd pain, rectal bleeding   GENITOURINARY:  See HPI  ENDOCRINE:  No heat or cold intolerance  HEMATOLOGIC:  No easy bruisability or bleeding   LYMPHATICS:  No enlarged nodes  MUSCULOSKELETAL:  No acute joint pain or swelling  SKIN:  No rash, lesions, jaundice  NEUROLOGIC:  No dizziness, weakness, syncope  PSYCHIATRIC:  No significant mood changes, homicidal/suicidal ideations    Physical Exam:     /64   Ht 5' 3" (1.6 m)   Wt 112.1 kg (247 lb 2.2 oz)   LMP 09/16/2022   BMI 43.78 kg/m²   Pulse 60's, Resp rate 18     GENERAL:  No acute distress, well-nourished  HEENT:  Atraumatic, anicteric, moist " mucus membranes. Neck supple w/o masses.  BREAST:  Symmetric, nontender, no obvious masses, adenopathy, skin changes or nipple discharge.  LUNGS:  Clear normal respiratory effort  HEART:  Regular rate and rhythm, no murmurs, gallops, or rubs  ABDOMEN:  Soft, non-tender, non-distended, normoactive bowel sounds, no obvious organomegaly  EXT:  Symmetric w/o cramping, claudication, or edema. +2 distal pulses.  SKIN:  No rashes or bruising  PSYCH:  Mood and affect appropriate  NEURO:  Grossly intact bilaterally    GENITOURINARY:    VULVAR:  Female external genitalia w/o obvious lesions. Female hair distribution. Normal urethral meatus. No gross lymphadenopathy.    VAGINA:  Well estrogenized with good rugation. Normal lubrication. Good support. No obvious lesion. No discharge.  CERVIX:  No cervical motion tenderness, discharge, or obvious lesions.   UTERUS:  Small, non-tender, normal contour  ADNEXA:  No masses, non-tender    RECTAL:  Deferred. No obvious external lesions    Chaperone present for exam.    Assessment:     32 y.o. G0:    Well woman gynecologic exam    Plan:    A gynecologic health assessment was performed with age appropriate counseling.    Cervical cancer screening - pap obtained.    STI screening - pt declined.      Encourage healthy lifestyle modifications, monthly self breast exams, COVID vaccines.    F/u with PCP for health maintenance.    Return 1 year for gynecologic exam, or sooner as needed.  All questions answered, pt voiced understanding.        Julio César Sapp MD

## 2022-09-26 ENCOUNTER — TELEPHONE (OUTPATIENT)
Dept: ORTHOPEDICS | Facility: CLINIC | Age: 32
End: 2022-09-26
Payer: COMMERCIAL

## 2022-09-26 ENCOUNTER — TELEPHONE (OUTPATIENT)
Dept: INTERNAL MEDICINE | Facility: CLINIC | Age: 32
End: 2022-09-26
Payer: COMMERCIAL

## 2022-09-26 ENCOUNTER — PATIENT MESSAGE (OUTPATIENT)
Dept: INTERNAL MEDICINE | Facility: CLINIC | Age: 32
End: 2022-09-26
Payer: COMMERCIAL

## 2022-09-26 NOTE — TELEPHONE ENCOUNTER
----- Message from José Sawyer MD sent at 9/26/2022 12:42 PM CDT -----  Regarding: RE: hip replacement, rapid weight gain eval?  Will, Thanks for reaching out.  If her insurance doesn't cover bariatric medicine consult, I do prescribe a few weight loss supplements (Qsymia, Wegovy, Mounjaro, etc).  Mounjaro's savings plan does not require insurance to cover the medication at this time.  As far as additional medical workup, we've already screened for the more common causes: insulin resistance, hypothyroidism, etc.  I haven't checked her for hypercortisolism but she doesn't have any other characteristic findings associated with that.  Also, PCOS may be contributing but she doesn't report any other gyn related symptoms.  We can also check for SHANTA even if she is not having overt apnea or snoring/daytime somnolence.  Treating SHANTA can accelerate weight loss.  Unfortunately, for most patients, it is an unbalanced equation of caloric intake vs expenditure.  I'll contact her to see if she wants to try a weight loss medication.    José  ----- Message -----  From: Ramsey Burdick III, MD  Sent: 9/25/2022  12:40 PM CDT  To: José Sawyer MD, [facility], #  Subject: hip replacement, rapid weight gain eval?         Dr Sawyer,   Looks like Ms Hayes established care with you in August.     I just saw her for a 2nd ortho opinion:  She has severe left hip arthritis as a result of her congenital hip dysplasia.   She needs a total hip replacement but is very high risk for complications due to her BMI of 43.    Her BMI needs to be below 40, and ideally back to her pre-pandemic baseline, to help optimize her chances of a successful total hip outcome.     She reports a significant weight gain of 100lbs over the past 3 years of the pandemic  I've referred her to the medical fitness program, the bariatric medicine clinic, and aquatic therapy.   (However, her [facility] insurance will typically not cover bariatric  medicine)    I also think she might benefit from a more in depth medical workup for her weight gain?     Would appreciate your thoughts.     Thank you,  Hawk Burdick

## 2022-09-26 NOTE — TELEPHONE ENCOUNTER
Received note from duran Mejía about pt losing weight prior to any surgery.  Please contact pt to see if she wants to set up a virtual visit to discuss weight loss strategies.

## 2022-09-27 LAB
FINAL PATHOLOGIC DIAGNOSIS: NORMAL
HPV HR 12 DNA SPEC QL NAA+PROBE: NEGATIVE
HPV16 AG SPEC QL: NEGATIVE
HPV18 DNA SPEC QL NAA+PROBE: NEGATIVE
Lab: NORMAL

## 2022-10-01 ENCOUNTER — HOSPITAL ENCOUNTER (OUTPATIENT)
Dept: RADIOLOGY | Facility: HOSPITAL | Age: 32
Discharge: HOME | End: 2022-10-01
Attending: ORTHOPAEDIC SURGERY
Payer: COMMERCIAL

## 2022-10-01 DIAGNOSIS — M25.552 LEFT HIP PAIN: ICD-10-CM

## 2022-10-01 PROCEDURE — 76377 3D RENDER W/INTRP POSTPROCES: CPT | Mod: 26,,, | Performed by: RADIOLOGY

## 2022-10-01 PROCEDURE — 72192 CT PELVIS W/O DYE: CPT | Mod: 26,,, | Performed by: RADIOLOGY

## 2022-10-01 PROCEDURE — 72192 CT PELVIS W/O DYE: CPT | Mod: TC

## 2022-10-01 PROCEDURE — 76377 PR  3D RENDERING W/ IMAGE POSTPROCESS: ICD-10-PCS | Mod: 26,,, | Performed by: RADIOLOGY

## 2022-10-01 PROCEDURE — 72192 CT PELVIS WITHOUT CONTRAST: ICD-10-PCS | Mod: 26,,, | Performed by: RADIOLOGY

## 2022-10-02 ENCOUNTER — HOSPITAL ENCOUNTER (OUTPATIENT)
Dept: RADIOLOGY | Facility: HOSPITAL | Age: 32
Discharge: HOME | End: 2022-10-02
Attending: ORTHOPAEDIC SURGERY
Payer: COMMERCIAL

## 2022-10-02 DIAGNOSIS — M25.552 LEFT HIP PAIN: ICD-10-CM

## 2022-10-02 PROCEDURE — 76377 3D RENDER W/INTRP POSTPROCES: CPT | Mod: TC

## 2022-10-18 ENCOUNTER — OFFICE VISIT (OUTPATIENT)
Dept: ORTHOPEDICS | Facility: CLINIC | Age: 32
End: 2022-10-18
Payer: COMMERCIAL

## 2022-10-18 VITALS — HEIGHT: 64 IN | BODY MASS INDEX: 41.23 KG/M2 | WEIGHT: 241.5 LBS

## 2022-10-18 DIAGNOSIS — M16.32 OSTEOARTHRITIS RESULTING FROM LEFT HIP DYSPLASIA: Primary | ICD-10-CM

## 2022-10-18 PROCEDURE — 99999 PR PBB SHADOW E&M-EST. PATIENT-LVL II: ICD-10-PCS | Mod: PBBFAC,,, | Performed by: ORTHOPAEDIC SURGERY

## 2022-10-18 PROCEDURE — 99999 PR PBB SHADOW E&M-EST. PATIENT-LVL II: CPT | Mod: PBBFAC,,, | Performed by: ORTHOPAEDIC SURGERY

## 2022-10-18 PROCEDURE — 3008F BODY MASS INDEX DOCD: CPT | Mod: CPTII,S$GLB,, | Performed by: ORTHOPAEDIC SURGERY

## 2022-10-18 PROCEDURE — 99212 OFFICE O/P EST SF 10 MIN: CPT | Mod: S$GLB,,, | Performed by: ORTHOPAEDIC SURGERY

## 2022-10-18 PROCEDURE — 1159F PR MEDICATION LIST DOCUMENTED IN MEDICAL RECORD: ICD-10-PCS | Mod: CPTII,S$GLB,, | Performed by: ORTHOPAEDIC SURGERY

## 2022-10-18 PROCEDURE — 99212 PR OFFICE/OUTPT VISIT, EST, LEVL II, 10-19 MIN: ICD-10-PCS | Mod: S$GLB,,, | Performed by: ORTHOPAEDIC SURGERY

## 2022-10-18 PROCEDURE — 1159F MED LIST DOCD IN RCRD: CPT | Mod: CPTII,S$GLB,, | Performed by: ORTHOPAEDIC SURGERY

## 2022-10-18 PROCEDURE — 3008F PR BODY MASS INDEX (BMI) DOCUMENTED: ICD-10-PCS | Mod: CPTII,S$GLB,, | Performed by: ORTHOPAEDIC SURGERY

## 2022-10-18 NOTE — PROGRESS NOTES
Subjective:     HPI:   Amalia Hayes is a 32 y.o. female who presents for f/u L hip pain, CT scan    No significant pain today, just with cold weather  Overall feels better  No pain meds    Has lost 5 lbs, BMI now 42    -Has virtual visit with PCP Monday to discuss weight loss options/meds  -on wait list for aquatic PT, working out on her own     Objective:   Body mass index is 42.11 kg/m².  Exam:  Non antalgic gait today  On pain with IR/ER      Imaging:  CT pelvis reviewed for pre-op planning, once screw head very close to superior acetabular rim      Assessment:       ICD-10-CM ICD-9-CM   1. Osteoarthritis resulting from left hip dysplasia  M16.32 715.25      B hip dsyplasia, initial lateral surgery in [facility] as an infant, L HALIE at [facility]   Severe L hip arthritis    BMI 42 down from 43     Plan:       Continue current plan for aggressive weight loss efforts  Rec looking into aquatic exercise classes as on aquatic PT wait list    Will need XIOMY at some point   Likely post, dual mobility    F/u 1 year for repeat XR, new BMI or sooner if pain progresses    No orders of the defined types were placed in this encounter.            No past medical history on file.    Past Surgical History:   Procedure Laterality Date    HIP SURGERY Left        Family History   Problem Relation Age of Onset    No Known Problems Father     No Known Problems Mother     Breast cancer Neg Hx     Colon cancer Neg Hx     Diabetes Neg Hx     Eclampsia Neg Hx     Hypertension Neg Hx     Miscarriages / Stillbirths Neg Hx     Ovarian cancer Neg Hx      labor Neg Hx     Stroke Neg Hx     Cancer Neg Hx        Social History     Socioeconomic History    Marital status: Single   Tobacco Use    Smoking status: Never    Smokeless tobacco: Never   Substance and Sexual Activity    Alcohol use: Never    Drug use: Never    Sexual activity: Not Currently     Birth control/protection: None     Comment: NOT ACTIVE IN 10 YEARS

## 2022-10-24 ENCOUNTER — OFFICE VISIT (OUTPATIENT)
Dept: INTERNAL MEDICINE | Facility: CLINIC | Age: 32
End: 2022-10-24
Payer: COMMERCIAL

## 2022-10-24 DIAGNOSIS — M87.052 AVASCULAR NECROSIS OF LEFT FEMUR: ICD-10-CM

## 2022-10-24 DIAGNOSIS — E66.01 MORBID OBESITY: Primary | ICD-10-CM

## 2022-10-24 PROCEDURE — 99214 PR OFFICE/OUTPT VISIT, EST, LEVL IV, 30-39 MIN: ICD-10-PCS | Mod: 95,,, | Performed by: INTERNAL MEDICINE

## 2022-10-24 PROCEDURE — 99214 OFFICE O/P EST MOD 30 MIN: CPT | Mod: 95,,, | Performed by: INTERNAL MEDICINE

## 2022-10-24 RX ORDER — SEMAGLUTIDE 0.25 MG/.5ML
0.25 INJECTION, SOLUTION SUBCUTANEOUS
Qty: 2 ML | Refills: 0 | Status: SHIPPED | OUTPATIENT
Start: 2022-10-24

## 2022-10-24 NOTE — PROGRESS NOTES
[facility] Virtual Visit Note    The patient location is: [place]  The chief complaint leading to consultation is:  Weight loss counseling  Visit type: Virtual visit with synchronous audio and video  Total time spent with patient:  20 minutes  Each patient to whom he or she provides medical services by telemedicine is:  (1) informed of the relationship between the physician and patient and the respective role of any other health care provider with respect to management of the patient; and (2) notified that he or she may decline to receive medical services by telemedicine and may withdraw from such care at any time.      Chief Complaint    No chief complaint on file.      History of Present Illness      Amalia Hayes is a 32 y.o. female with chronic conditions of morbid obesity, chronic left hip pain who presents today for:  Weight loss counseling.  Patient interested in starting weight loss medication.  Has already started with diet and exercise.  Has been told by orthopedist to lose weight in order to prevent further left hip pain.    Past Medical History:  No past medical history on file.    Past Surgical History:   has a past surgical history that includes Hip surgery (Left).    Family History:  family history includes No Known Problems in her father and mother.     Social History:  Social History     Tobacco Use    Smoking status: Never    Smokeless tobacco: Never   Substance Use Topics    Alcohol use: Never    Drug use: Never       Review of Systems   Constitutional:  Negative for chills, fever and malaise/fatigue.   HENT:  Negative for hearing loss.    Eyes:  Negative for discharge.   Respiratory:  Negative for shortness of breath and wheezing.    Cardiovascular:  Negative for chest pain and palpitations.   Gastrointestinal:  Negative for blood in stool, constipation, diarrhea, nausea and vomiting.   Genitourinary:  Negative for dysuria and hematuria.   Musculoskeletal:  Negative for neck pain.    Skin:  Negative for rash.   Neurological:  Negative for weakness and headaches.   Endo/Heme/Allergies:  Negative for polydipsia.   All other systems reviewed and are negative.     Medications:  Outpatient Encounter Medications as of 10/24/2022   Medication Sig Note Dispense Refill    fluticasone propionate (FLONASE) 50 mcg/actuation nasal spray 1 spray (50 mcg total) by Each Nostril route once daily. 9/22/2022: PT TAKES AS NEEDED IN THE EVENINGS 16 g 1    semaglutide, weight loss, (WEGOVY) 0.25 mg/0.5 mL PnIj Inject 0.25 mg into the skin every 7 days.  2 mL 0     No facility-administered encounter medications on file as of 10/24/2022.       Allergies:  Review of patient's allergies indicates:  No Known Allergies    Health Maintenance:  Immunization History   Administered Date(s) Administered    HPV Quadrivalent 02/01/2007, 05/03/2007    Hepatitis B 09/27/2002, 02/01/2007    Hepatitis B, Pediatric/Adolescent 05/03/2007    IPV 05/03/2007    MMR 09/27/2002, 05/03/2007    Meningococcal Conjugate (MCV4P) 02/01/2007    Td (ADULT) 09/27/2002    Tdap 05/03/2007, 08/25/2022      Health Maintenance   Topic Date Due    TETANUS VACCINE  08/25/2032    Hepatitis C Screening  Completed    Lipid Panel  Completed        Physical Exam       ]    Physical Exam  Constitutional:       General: She is not in acute distress.     Appearance: She is well-developed. She is not diaphoretic.   Eyes:      General: No scleral icterus.        Right eye: No discharge.         Left eye: No discharge.      Conjunctiva/sclera: Conjunctivae normal.   Pulmonary:      Effort: Pulmonary effort is normal. No respiratory distress.   Neurological:      Mental Status: She is alert and oriented to person, place, and time.   Psychiatric:         Mood and Affect: Mood normal.         Behavior: Behavior normal.         Thought Content: Thought content normal.         Judgment: Judgment normal.        Laboratory:  CBC:  Recent Labs   Lab 08/25/22  1003   WBC  9.01   RBC 4.87   Hemoglobin 13.6   Hematocrit 44.2   Platelets 368   MCV 91   MCH 27.9   MCHC 30.8 L     CMP:  Recent Labs   Lab 08/25/22  1003   Glucose 109   Calcium 9.9   Albumin 4.0   Total Protein 7.6   Sodium 145   Potassium 4.1   CO2 26   Chloride 109   BUN 14   Alkaline Phosphatase 59   ALT 18   AST 15   Total Bilirubin 0.5     URINALYSIS:       LIPIDS:  Recent Labs   Lab 08/25/22  1003   TSH 1.921   HDL 38 L   Cholesterol 161   Triglycerides 85   LDL Cholesterol 106.0   HDL/Cholesterol Ratio 23.6   Non-HDL Cholesterol 123   Total Cholesterol/HDL Ratio 4.2     TSH:  Recent Labs   Lab 08/25/22  1003   TSH 1.921     A1C:        Assessment/Plan     Amalia Hayes is a 32 y.o.female with:    1. Morbid obesity  - semaglutide, weight loss, (WEGOVY) 0.25 mg/0.5 mL PnIj; Inject 0.25 mg into the skin every 7 days.  Dispense: 2 mL; Refill: 0    2. Avascular necrosis of left femur  - semaglutide, weight loss, (WEGOVY) 0.25 mg/0.5 mL PnIj; Inject 0.25 mg into the skin every 7 days.  Dispense: 2 mL; Refill: 0   Discussed different options.  Decided to start with Wegovy.  Patient will call if insurance does not cover.  If so, also discussed Qsymia.  Stressed importance of continue with diet and exercise.  Follow up in 6 weeks if able to get medications filled.    Chronic conditions status updated as per HPI.  Other than changes above, cont current medications and maintain follow up with specialists.  No follow-ups on file.    No future appointments.    José Sawyer MD  [facility]                  Answers submitted by the patient for this visit:  Review of Systems Questionnaire (Submitted on 10/21/2022)  activity change: No  unexpected weight change: No  rhinorrhea: No  trouble swallowing: No  visual disturbance: No  chest tightness: No  polyuria: No  difficulty urinating: No  menstrual problem: No  joint swelling: No  arthralgias: No  confusion: No  dysphoric mood: No

## 2022-11-29 ENCOUNTER — OFFICE VISIT (OUTPATIENT)
Dept: INTERNAL MEDICINE | Facility: CLINIC | Age: 32
End: 2022-11-29
Payer: COMMERCIAL

## 2022-11-29 VITALS
WEIGHT: 235.44 LBS | HEART RATE: 91 BPM | DIASTOLIC BLOOD PRESSURE: 80 MMHG | HEIGHT: 64 IN | BODY MASS INDEX: 40.19 KG/M2 | OXYGEN SATURATION: 99 % | SYSTOLIC BLOOD PRESSURE: 120 MMHG

## 2022-11-29 DIAGNOSIS — R21 RASH: Primary | ICD-10-CM

## 2022-11-29 PROCEDURE — 3079F DIAST BP 80-89 MM HG: CPT | Mod: CPTII,S$GLB,, | Performed by: INTERNAL MEDICINE

## 2022-11-29 PROCEDURE — 1159F MED LIST DOCD IN RCRD: CPT | Mod: CPTII,S$GLB,, | Performed by: INTERNAL MEDICINE

## 2022-11-29 PROCEDURE — 3079F PR MOST RECENT DIASTOLIC BLOOD PRESSURE 80-89 MM HG: ICD-10-PCS | Mod: CPTII,S$GLB,, | Performed by: INTERNAL MEDICINE

## 2022-11-29 PROCEDURE — 99214 PR OFFICE/OUTPT VISIT, EST, LEVL IV, 30-39 MIN: ICD-10-PCS | Mod: 25,S$GLB,, | Performed by: INTERNAL MEDICINE

## 2022-11-29 PROCEDURE — 99999 PR PBB SHADOW E&M-EST. PATIENT-LVL IV: CPT | Mod: PBBFAC,,, | Performed by: INTERNAL MEDICINE

## 2022-11-29 PROCEDURE — 3008F BODY MASS INDEX DOCD: CPT | Mod: CPTII,S$GLB,, | Performed by: INTERNAL MEDICINE

## 2022-11-29 PROCEDURE — 99214 OFFICE O/P EST MOD 30 MIN: CPT | Mod: 25,S$GLB,, | Performed by: INTERNAL MEDICINE

## 2022-11-29 PROCEDURE — 1159F PR MEDICATION LIST DOCUMENTED IN MEDICAL RECORD: ICD-10-PCS | Mod: CPTII,S$GLB,, | Performed by: INTERNAL MEDICINE

## 2022-11-29 PROCEDURE — 99999 PR PBB SHADOW E&M-EST. PATIENT-LVL IV: ICD-10-PCS | Mod: PBBFAC,,, | Performed by: INTERNAL MEDICINE

## 2022-11-29 PROCEDURE — 1160F RVW MEDS BY RX/DR IN RCRD: CPT | Mod: CPTII,S$GLB,, | Performed by: INTERNAL MEDICINE

## 2022-11-29 PROCEDURE — 3008F PR BODY MASS INDEX (BMI) DOCUMENTED: ICD-10-PCS | Mod: CPTII,S$GLB,, | Performed by: INTERNAL MEDICINE

## 2022-11-29 PROCEDURE — 96372 PR INJECTION,THERAP/PROPH/DIAG2ST, IM OR SUBCUT: ICD-10-PCS | Mod: S$GLB,,, | Performed by: INTERNAL MEDICINE

## 2022-11-29 PROCEDURE — 3074F SYST BP LT 130 MM HG: CPT | Mod: CPTII,S$GLB,, | Performed by: INTERNAL MEDICINE

## 2022-11-29 PROCEDURE — 96372 THER/PROPH/DIAG INJ SC/IM: CPT | Mod: S$GLB,,, | Performed by: INTERNAL MEDICINE

## 2022-11-29 PROCEDURE — 3074F PR MOST RECENT SYSTOLIC BLOOD PRESSURE < 130 MM HG: ICD-10-PCS | Mod: CPTII,S$GLB,, | Performed by: INTERNAL MEDICINE

## 2022-11-29 PROCEDURE — 1160F PR REVIEW ALL MEDS BY PRESCRIBER/CLIN PHARMACIST DOCUMENTED: ICD-10-PCS | Mod: CPTII,S$GLB,, | Performed by: INTERNAL MEDICINE

## 2022-11-29 RX ORDER — TRIAMCINOLONE ACETONIDE 40 MG/ML
40 INJECTION, SUSPENSION INTRA-ARTICULAR; INTRAMUSCULAR ONCE
Status: COMPLETED | OUTPATIENT
Start: 2022-11-29 | End: 2022-11-29

## 2022-11-29 RX ORDER — FLUCONAZOLE 100 MG/1
TABLET ORAL
Qty: 8 TABLET | Refills: 0 | Status: SHIPPED | OUTPATIENT
Start: 2022-11-29

## 2022-11-29 RX ADMIN — TRIAMCINOLONE ACETONIDE 40 MG: 40 INJECTION, SUSPENSION INTRA-ARTICULAR; INTRAMUSCULAR at 04:11

## 2022-11-29 NOTE — PROGRESS NOTES
[facility] Note    Chief Complaint      Chief Complaint   Patient presents with    Rash     Small red bumps on stomach, arms and back of neck       History of Present Illness      Amalia Hayes is a 32 y.o. female with no chronic conditions who presents today for: rash.  Started with red, raised papular rash on the back of her neck that has spread to her trunk and arms.  Has become pruritic as it has spread.  Has tried moisturizing lotions, no other OTC.    Past Medical History:  History reviewed. No pertinent past medical history.    Past Surgical History:   has a past surgical history that includes Hip surgery (Left).    Family History:  family history includes No Known Problems in her father and mother.     Social History:  Social History     Tobacco Use    Smoking status: Never    Smokeless tobacco: Never   Substance Use Topics    Alcohol use: Never    Drug use: Never       I personally reviewed all past medical, surgical, social and family history.    Review of Systems   Constitutional:  Negative for chills, fever and malaise/fatigue.   HENT:  Negative for congestion and sore throat.    Eyes:  Negative for pain.   Respiratory:  Negative for cough and shortness of breath.    Cardiovascular:  Negative for chest pain.   Gastrointestinal:  Negative for constipation, diarrhea, nausea and vomiting.   Musculoskeletal:  Negative for joint pain.   Skin:  Positive for rash.   Neurological:  Negative for weakness.   All other systems reviewed and are negative.     Medications:  Outpatient Encounter Medications as of 11/29/2022   Medication Sig Note Dispense Refill    fluconazole (DIFLUCAN) 100 MG tablet Take 2 tablets by mouth on day 1, then 1 tablet by mouth daily on days 2-7  8 tablet 0    fluticasone propionate (FLONASE) 50 mcg/actuation nasal spray 1 spray (50 mcg total) by Each Nostril route once daily. (Patient not taking: Reported on 11/29/2022) 9/22/2022: PT TAKES AS NEEDED IN THE  "EVENINGS 16 g 1    semaglutide, weight loss, (WEGOVY) 0.25 mg/0.5 mL PnIj Inject 0.25 mg into the skin every 7 days. (Patient not taking: Reported on 11/29/2022)  2 mL 0     Facility-Administered Encounter Medications as of 11/29/2022   Medication Dose Route Frequency Provider Last Rate Last Admin    [COMPLETED] triamcinolone acetonide injection 40 mg  40 mg Intramuscular Once José Sawyer MD   40 mg at 11/29/22 1601       Allergies:  Review of patient's allergies indicates:  No Known Allergies    Health Maintenance:  Immunization History   Administered Date(s) Administered    HPV Quadrivalent 02/01/2007, 05/03/2007    Hepatitis B 09/27/2002, 02/01/2007    Hepatitis B, Pediatric/Adolescent 05/03/2007    IPV 05/03/2007    MMR 09/27/2002, 05/03/2007    Meningococcal Conjugate (MCV4P) 02/01/2007    Td (ADULT) 09/27/2002    Tdap 05/03/2007, 08/25/2022      Health Maintenance   Topic Date Due    TETANUS VACCINE  08/25/2032    Hepatitis C Screening  Completed    Lipid Panel  Completed        Physical Exam      Vital Signs  Pulse: 91  SpO2: 99 %  BP: 120/80  BP Location: Left arm  Patient Position: Sitting  Pain Score: 0-No pain  Height and Weight  Height: 5' 3.5" (161.3 cm)  Weight: 106.8 kg (235 lb 7.2 oz)  BSA (Calculated - sq m): 2.19 sq meters  BMI (Calculated): 41  Weight in (lb) to have BMI = 25: 143.1]    Physical Exam  Vitals reviewed.   Constitutional:       Appearance: She is well-developed.   HENT:      Head: Normocephalic and atraumatic.      Right Ear: External ear normal.      Left Ear: External ear normal.   Cardiovascular:      Rate and Rhythm: Normal rate and regular rhythm.      Heart sounds: Normal heart sounds. No murmur heard.  Pulmonary:      Effort: Pulmonary effort is normal.      Breath sounds: Normal breath sounds. No wheezing or rales.   Abdominal:      General: Bowel sounds are normal. There is no distension.      Palpations: Abdomen is soft.      Tenderness: There is no abdominal " tenderness.   Skin:     Findings: Rash (erythematous plaques with overlying scaling distributed around neck, trunk and upper arms, early lesions forming on forearms.) present.        Laboratory:  CBC:  Recent Labs   Lab 08/25/22  1003   WBC 9.01   RBC 4.87   Hemoglobin 13.6   Hematocrit 44.2   Platelets 368   MCV 91   MCH 27.9   MCHC 30.8 L     CMP:  Recent Labs   Lab 08/25/22  1003   Glucose 109   Calcium 9.9   Albumin 4.0   Total Protein 7.6   Sodium 145   Potassium 4.1   CO2 26   Chloride 109   BUN 14   Alkaline Phosphatase 59   ALT 18   AST 15   Total Bilirubin 0.5     URINALYSIS:       LIPIDS:  Recent Labs   Lab 08/25/22  1003   TSH 1.921   HDL 38 L   Cholesterol 161   Triglycerides 85   LDL Cholesterol 106.0   HDL/Cholesterol Ratio 23.6   Non-HDL Cholesterol 123   Total Cholesterol/HDL Ratio 4.2     TSH:  Recent Labs   Lab 08/25/22  1003   TSH 1.921     A1C:        Assessment/Plan     Amalia Hayes is a 32 y.o.female with:    1. Rash  - fluconazole (DIFLUCAN) 100 MG tablet; Take 2 tablets by mouth on day 1, then 1 tablet by mouth daily on days 2-7  Dispense: 8 tablet; Refill: 0  - triamcinolone acetonide injection 40 mg  - Ambulatory referral/consult to Dermatology; Future  Possibly psoriasis.  Will treat with steroid injection and antifungal until can be seen by dermatology.    Chronic conditions status updated as per HPI.  Other than changes above, cont current medications and maintain follow up with specialists.  No follow-ups on file.    No future appointments.    José Sawyer MD  [facility]                  Answers submitted by the patient for this visit:  Rash Questionnaire (Submitted on 11/28/2022)  Chief Complaint: Rash  Chronicity: new  Onset: in the past 7 days  Progression since onset: unchanged  Affected locations: scalp, head, neck, chest, back, abdomen  Characteristics: blistering, dryness, redness, scaling  Exposed to: nothing, unknown  anorexia: No  facial edema:  No  fatigue: No  nail changes: No  rhinorrhea: No  Treatments tried: anti-itch cream  Improvement on treatment: no relief  asthma: No  allergies: No  eczema: No  varicella: No

## 2022-12-06 ENCOUNTER — OFFICE VISIT (OUTPATIENT)
Dept: DERMATOLOGY | Facility: CLINIC | Age: 32
End: 2022-12-06
Payer: COMMERCIAL

## 2022-12-06 ENCOUNTER — LAB VISIT (OUTPATIENT)
Dept: LAB | Facility: HOSPITAL | Age: 32
End: 2022-12-06
Attending: STUDENT IN AN ORGANIZED HEALTH CARE EDUCATION/TRAINING PROGRAM
Payer: COMMERCIAL

## 2022-12-06 DIAGNOSIS — L42 PITYRIASIS ROSEA: ICD-10-CM

## 2022-12-06 DIAGNOSIS — L42 PITYRIASIS ROSEA: Primary | ICD-10-CM

## 2022-12-06 PROCEDURE — 99999 PR PBB SHADOW E&M-EST. PATIENT-LVL III: ICD-10-PCS | Mod: PBBFAC,,, | Performed by: STUDENT IN AN ORGANIZED HEALTH CARE EDUCATION/TRAINING PROGRAM

## 2022-12-06 PROCEDURE — 1160F PR REVIEW ALL MEDS BY PRESCRIBER/CLIN PHARMACIST DOCUMENTED: ICD-10-PCS | Mod: CPTII,S$GLB,, | Performed by: STUDENT IN AN ORGANIZED HEALTH CARE EDUCATION/TRAINING PROGRAM

## 2022-12-06 PROCEDURE — 1159F PR MEDICATION LIST DOCUMENTED IN MEDICAL RECORD: ICD-10-PCS | Mod: CPTII,S$GLB,, | Performed by: STUDENT IN AN ORGANIZED HEALTH CARE EDUCATION/TRAINING PROGRAM

## 2022-12-06 PROCEDURE — 36415 COLL VENOUS BLD VENIPUNCTURE: CPT | Performed by: STUDENT IN AN ORGANIZED HEALTH CARE EDUCATION/TRAINING PROGRAM

## 2022-12-06 PROCEDURE — 99203 PR OFFICE/OUTPT VISIT, NEW, LEVL III, 30-44 MIN: ICD-10-PCS | Mod: S$GLB,,, | Performed by: STUDENT IN AN ORGANIZED HEALTH CARE EDUCATION/TRAINING PROGRAM

## 2022-12-06 PROCEDURE — 86592 SYPHILIS TEST NON-TREP QUAL: CPT | Performed by: STUDENT IN AN ORGANIZED HEALTH CARE EDUCATION/TRAINING PROGRAM

## 2022-12-06 PROCEDURE — 99999 PR PBB SHADOW E&M-EST. PATIENT-LVL III: CPT | Mod: PBBFAC,,, | Performed by: STUDENT IN AN ORGANIZED HEALTH CARE EDUCATION/TRAINING PROGRAM

## 2022-12-06 PROCEDURE — 1159F MED LIST DOCD IN RCRD: CPT | Mod: CPTII,S$GLB,, | Performed by: STUDENT IN AN ORGANIZED HEALTH CARE EDUCATION/TRAINING PROGRAM

## 2022-12-06 PROCEDURE — 1160F RVW MEDS BY RX/DR IN RCRD: CPT | Mod: CPTII,S$GLB,, | Performed by: STUDENT IN AN ORGANIZED HEALTH CARE EDUCATION/TRAINING PROGRAM

## 2022-12-06 PROCEDURE — 99203 OFFICE O/P NEW LOW 30 MIN: CPT | Mod: S$GLB,,, | Performed by: STUDENT IN AN ORGANIZED HEALTH CARE EDUCATION/TRAINING PROGRAM

## 2022-12-06 RX ORDER — HYDROXYZINE HYDROCHLORIDE 25 MG/1
25 TABLET, FILM COATED ORAL NIGHTLY PRN
Qty: 60 TABLET | Refills: 1 | Status: SHIPPED | OUTPATIENT
Start: 2022-12-06

## 2022-12-06 RX ORDER — TRIAMCINOLONE ACETONIDE 1 MG/G
OINTMENT TOPICAL 2 TIMES DAILY
Qty: 454 G | Refills: 1 | Status: SHIPPED | OUTPATIENT
Start: 2022-12-06

## 2022-12-06 RX ORDER — VALACYCLOVIR HYDROCHLORIDE 1 G/1
1000 TABLET, FILM COATED ORAL EVERY 8 HOURS
Qty: 21 TABLET | Refills: 0 | Status: SHIPPED | OUTPATIENT
Start: 2022-12-06 | End: 2022-12-13

## 2022-12-06 NOTE — PROGRESS NOTES
Subjective:       Patient ID:  Amalia Hayes is a 32 y.o. female who presents for   Chief Complaint   Patient presents with    Rash     Rash - Initial  Affected locations: torso, back, neck.  Duration: since Thanksgiving.  Signs / symptoms: itching  Treatments tried: fluconazole (started one week ago) gotten worse since taking.    Review of Systems   Skin:  Positive for itching and rash.   Hematologic/Lymphatic: Does not bruise/bleed easily.      Objective:    Physical Exam   Constitutional: She appears well-developed and well-nourished. No distress.   Neurological: She is alert and oriented to person, place, and time. She is not disoriented.   Psychiatric: She has a normal mood and affect.   Skin:   Areas Examined (abnormalities noted in diagram):   Chest / Axilla Inspection Performed  Abdomen Inspection Performed  Back Inspection Performed  RUE Inspected  LUE Inspection Performed            Diagram Legend     Erythematous scaling macule/papule c/w actinic keratosis       Vascular papule c/w angioma      Pigmented verrucoid papule/plaque c/w seborrheic keratosis      Yellow umbilicated papule c/w sebaceous hyperplasia      Irregularly shaped tan macule c/w lentigo     1-2 mm smooth white papules consistent with Milia      Movable subcutaneous cyst with punctum c/w epidermal inclusion cyst      Subcutaneous movable cyst c/w pilar cyst      Firm pink to brown papule c/w dermatofibroma      Pedunculated fleshy papule(s) c/w skin tag(s)      Evenly pigmented macule c/w junctional nevus     Mildly variegated pigmented, slightly irregular-bordered macule c/w mildly atypical nevus      Flesh colored to evenly pigmented papule c/w intradermal nevus       Pink pearly papule/plaque c/w basal cell carcinoma      Erythematous hyperkeratotic cursted plaque c/w SCC      Surgical scar with no sign of skin cancer recurrence      Open and closed comedones      Inflammatory papules and pustules      Verrucoid papule  consistent consistent with wart     Erythematous eczematous patches and plaques     Dystrophic onycholytic nail with subungual debris c/w onychomycosis     Umbilicated papule    Erythematous-base heme-crusted tan verrucoid plaque consistent with inflamed seborrheic keratosis     Erythematous Silvery Scaling Plaque c/w Psoriasis     See annotation      Assessment / Plan:        Pityriasis rosea  - counseled on self limited nature of condition  - counseled that morphology can be indistinguishable from secondary syphilis so RPR ordered    -     Ambulatory referral/consult to Dermatology  -     RPR; Future; Expected date: 12/06/2022  -     hydrOXYzine HCL (ATARAX) 25 MG tablet; Take 1 tablet (25 mg total) by mouth nightly as needed for Itching.  Dispense: 60 tablet; Refill: 1  -     triamcinolone acetonide 0.1% (KENALOG) 0.1 % ointment; Apply topically 2 (two) times daily. Use for up to 2 weeks then take a 1 week break or decrease to 3 times weekly. Do not apply to face or groin  Dispense: 454 g; Refill: 1  -     valACYclovir (VALTREX) 1000 MG tablet; Take 1 tablet (1,000 mg total) by mouth every 8 (eight) hours. for 7 days  Dispense: 21 tablet; Refill: 0           Follow up in about 2 months (around 2/6/2023). If rash is not resolving

## 2022-12-07 LAB — RPR SER QL: NORMAL

## 2023-02-06 ENCOUNTER — OFFICE VISIT (OUTPATIENT)
Dept: DERMATOLOGY | Facility: CLINIC | Age: 33
End: 2023-02-06
Payer: COMMERCIAL

## 2023-02-06 DIAGNOSIS — D23.9 DERMATOFIBROMA: ICD-10-CM

## 2023-02-06 DIAGNOSIS — L81.0 POSTINFLAMMATORY HYPERPIGMENTATION: ICD-10-CM

## 2023-02-06 DIAGNOSIS — L70.0 ACNE VULGARIS: ICD-10-CM

## 2023-02-06 DIAGNOSIS — L42 PITYRIASIS ROSEA: Primary | ICD-10-CM

## 2023-02-06 PROCEDURE — 1159F PR MEDICATION LIST DOCUMENTED IN MEDICAL RECORD: ICD-10-PCS | Mod: CPTII,S$GLB,, | Performed by: STUDENT IN AN ORGANIZED HEALTH CARE EDUCATION/TRAINING PROGRAM

## 2023-02-06 PROCEDURE — 1160F RVW MEDS BY RX/DR IN RCRD: CPT | Mod: CPTII,S$GLB,, | Performed by: STUDENT IN AN ORGANIZED HEALTH CARE EDUCATION/TRAINING PROGRAM

## 2023-02-06 PROCEDURE — 1159F MED LIST DOCD IN RCRD: CPT | Mod: CPTII,S$GLB,, | Performed by: STUDENT IN AN ORGANIZED HEALTH CARE EDUCATION/TRAINING PROGRAM

## 2023-02-06 PROCEDURE — 99214 OFFICE O/P EST MOD 30 MIN: CPT | Mod: S$GLB,,, | Performed by: STUDENT IN AN ORGANIZED HEALTH CARE EDUCATION/TRAINING PROGRAM

## 2023-02-06 PROCEDURE — 99214 PR OFFICE/OUTPT VISIT, EST, LEVL IV, 30-39 MIN: ICD-10-PCS | Mod: S$GLB,,, | Performed by: STUDENT IN AN ORGANIZED HEALTH CARE EDUCATION/TRAINING PROGRAM

## 2023-02-06 PROCEDURE — 1160F PR REVIEW ALL MEDS BY PRESCRIBER/CLIN PHARMACIST DOCUMENTED: ICD-10-PCS | Mod: CPTII,S$GLB,, | Performed by: STUDENT IN AN ORGANIZED HEALTH CARE EDUCATION/TRAINING PROGRAM

## 2023-02-06 PROCEDURE — 99999 PR PBB SHADOW E&M-EST. PATIENT-LVL III: ICD-10-PCS | Mod: PBBFAC,,, | Performed by: STUDENT IN AN ORGANIZED HEALTH CARE EDUCATION/TRAINING PROGRAM

## 2023-02-06 PROCEDURE — 99999 PR PBB SHADOW E&M-EST. PATIENT-LVL III: CPT | Mod: PBBFAC,,, | Performed by: STUDENT IN AN ORGANIZED HEALTH CARE EDUCATION/TRAINING PROGRAM

## 2023-02-06 RX ORDER — DAPSONE 75 MG/G
1 GEL TOPICAL DAILY
Qty: 60 G | Refills: 3 | Status: SHIPPED | OUTPATIENT
Start: 2023-02-06

## 2023-02-06 RX ORDER — TRETINOIN 0.04 MG/G
1 GEL TOPICAL NIGHTLY
Qty: 50 G | Refills: 2 | Status: SHIPPED | OUTPATIENT
Start: 2023-02-06

## 2023-02-06 NOTE — PATIENT INSTRUCTIONS
Acne Treatment    Retinoids (e.g. adapalene, tretinoin, tazarotene) are vitamin A derivatives that are the mainstay of acne therapy. The skin often becomes dry, red, or irritated when first using them--this is a normal period of adjustment.   Use only a pea-sized amount for the entire face to avoid excess irritation.   If your skin is sensitive, begin by using the medication two nights per week or every other night for the first couple of months until your skin adjusts.   Use as much oil-free moisturizer as needed to help your skin adjust to the retinoid.  There is no miracle, overnight cure for acne. It may take 6-8 weeks to start seeing some improvement, and you should continue to improve over the following months. It is important that you keep your follow up appointments so that any medication changes can be made if necessary.  Your acne may get worse before it gets better. This is normal! Just hang in there, incorporate the meds into your daily routine, and trust that the medication will work.   Do not scrub your face. Aggressive scrubbing can make acne worse. Gently washing your face 2x/day is essential to any successful acne regimen.   Do not pick or squeeze your pimples, as this will delay resolution of the picked/squeezed lesions and potentially lead to scarring. Acne is temporary, but scars are permanent.  Antibiotics: Antibiotics are sometimes prescribed to decrease acne by reducing inflammation. They are not a good long-term solution; they have side effects as all meds do; and they should always be used along with the topical treatments that are recommended.  Waxing: Stop using the retinoid 1 week prior to any waxing, as skin is more likely to tear.  Diet: Avoid eating foods with a high glycemic load/index (high sugar, simple carbs) which can worsen acne. Also, avoid drinking a lot of skim or low fat milk, and avoid the whey protein found in most protein shakes and bars, as these can also worsen  "acne.  Makeup: Use only oil-free, non-comedogenic makeup. Brands to consider include Neutrogena, Tarte, Bare Minerals, Maria Elena Lyonsle, Cassie Rodriguez, Clinique. (Avoid MAC.)  For female patients: Discontinue all oral and topical acne medications if you become pregnant or are planning to become pregnant. Notify our office, and we will direct you to medications that are safe to use during pregnancy.    Morning acne regimen:  ?  Wash face with panoxyl 4%  cleanser. See below for suggestions.  ?  Apply a thin film of dapsone to entire face  ?  If skin feels dry, apply a fragrance-free moisturizer, such as CeraVe PM lotion  ?  Apply a broad-spectrum sunscreen with SPF 30 or higher (This is especially important to avoid "dark spots" that can follow an acne lesion.)    Evening acne regimen:  ?  Wash face with gentle cleanser. See below for suggestions.  ?  Apply a pea-sized amount of tretinoin (retinoid) to the entire face.  ?  Apply a fragrance-free moisturizer, such as CeraVe PM lotion, if needed for dryness.    Cleanser options:  Gentle cleansers: CeraVe foaming wash, CeraVe hydrating cleanser, Neutrogena Ultra Gentle cleanser, Cetaphil cleanser  Benzoyl peroxide (2-5%): PanOxyl 4% Creamy Wash, Neutrogena Clear Pore Cleanser/Mask             *Note that benzoyl peroxide can bleach towels, sheets, and clothing if not rinsed well from the skin. May be best to keep in the shower.*  Salicylic acid (0.5-2%): CeraVe Renewing SA Cleanser, Neutrogena Oil-Free Acne Wash, Neutrogena Acne Proofing Gel Cleanser      "

## 2023-02-06 NOTE — PROGRESS NOTES
Subjective:       Patient ID:  Amalia Hayes is a 32 y.o. female who presents for   Chief Complaint   Patient presents with    Rash     F/U     Pt is here today for follow up of pitriasis rosea.  Pt used atarax 25 mg as instructed. Pt stopped using triamcinolone ointment. Pt used for roughly 2 weeks. Pt also used valtrex 1000 mg tablets.    Rash - Follow-up  Symptom course: improving  Affected locations: torso, left arm, right arm, left upper leg, right upper leg, left lower leg, right lower leg, left foot, right foot and neck  Signs / symptoms: itching (itchy occ)    Overall improving. Stil slightly scaling on abdomen and now primarily has dark spots where the rash was previously    Acne. Breaks out around jawline    Review of Systems   Skin:  Positive for itching (itchy occ) and rash.   Hematologic/Lymphatic: Does not bruise/bleed easily.      Objective:    Physical Exam   Constitutional: She appears well-developed and well-nourished. No distress.   Neurological: She is alert and oriented to person, place, and time. She is not disoriented.   Psychiatric: She has a normal mood and affect.   Skin:   Areas Examined (abnormalities noted in diagram):   Head / Face Inspection Performed  Neck Inspection Performed  Chest / Axilla Inspection Performed  Back Inspection Performed  RUE Inspected  LUE Inspection Performed                     Diagram Legend     Erythematous scaling macule/papule c/w actinic keratosis       Vascular papule c/w angioma      Pigmented verrucoid papule/plaque c/w seborrheic keratosis      Yellow umbilicated papule c/w sebaceous hyperplasia      Irregularly shaped tan macule c/w lentigo     1-2 mm smooth white papules consistent with Milia      Movable subcutaneous cyst with punctum c/w epidermal inclusion cyst      Subcutaneous movable cyst c/w pilar cyst      Firm pink to brown papule c/w dermatofibroma      Pedunculated fleshy papule(s) c/w skin tag(s)      Evenly pigmented macule  c/w junctional nevus     Mildly variegated pigmented, slightly irregular-bordered macule c/w mildly atypical nevus      Flesh colored to evenly pigmented papule c/w intradermal nevus       Pink pearly papule/plaque c/w basal cell carcinoma      Erythematous hyperkeratotic cursted plaque c/w SCC      Surgical scar with no sign of skin cancer recurrence      Open and closed comedones      Inflammatory papules and pustules      Verrucoid papule consistent consistent with wart     Erythematous eczematous patches and plaques     Dystrophic onycholytic nail with subungual debris c/w onychomycosis     Umbilicated papule    Erythematous-base heme-crusted tan verrucoid plaque consistent with inflamed seborrheic keratosis     Erythematous Silvery Scaling Plaque c/w Psoriasis     See annotation      Assessment / Plan:        Pityriasis rosea, now essentially resolved with postinflammatory hyperpigmentation  - reassured. Hyperpigmentation willf juve with time    Dermatofibroma  Reassurance given to patient. No treatment is necessary.   Treatment of benign, asymptomatic lesions may be considered cosmetic.    Acne, hormonal--chronic, not at goal  - Not on birth control  - Discussed options including topicals, OCPs, spironolactone. Will try topicals first. If not at goal then would consider her a good candidate for spironolactone  - tret micro 0.04% qhs. STart TIW then increase as tolerated  - dapsone 7.5% gel qd  - OTC BPO 4% cleanser         Follow up in about 2 months (around 4/6/2023).

## 2024-04-30 ENCOUNTER — OCCUPATIONAL HEALTH (OUTPATIENT)
Dept: URGENT CARE | Facility: CLINIC | Age: 34
End: 2024-04-30

## 2024-04-30 DIAGNOSIS — Z02.83 ENCOUNTER FOR DRUG SCREENING: Primary | ICD-10-CM

## 2024-04-30 LAB
CTP QC/QA: YES
POC 5 PANEL DRUG SCREEN: NEGATIVE

## 2024-04-30 PROCEDURE — 80305 DRUG TEST PRSMV DIR OPT OBS: CPT | Mod: S$GLB,,,

## 2024-05-07 ENCOUNTER — PATIENT OUTREACH (OUTPATIENT)
Dept: ADMINISTRATIVE | Facility: HOSPITAL | Age: 34
End: 2024-05-07
Payer: COMMERCIAL

## 2024-05-07 ENCOUNTER — PATIENT MESSAGE (OUTPATIENT)
Dept: ADMINISTRATIVE | Facility: HOSPITAL | Age: 34
End: 2024-05-07
Payer: COMMERCIAL

## 2024-05-07 NOTE — PROGRESS NOTES
Health Maintenance Due   Topic Date Due    COVID-19 Vaccine (1 - 2023-24 season) Never done     Chart review completed. HM Updated. Triggered Links. Immunizations reviewed and updated. Care Everywhere Updated. Care Team Updated.  Patient is due for annual visit with PCP. Outreached via portal in regards to scheduling appointment.

## 2024-06-17 ENCOUNTER — OFFICE VISIT (OUTPATIENT)
Dept: URGENT CARE | Facility: CLINIC | Age: 34
End: 2024-06-17
Payer: COMMERCIAL

## 2024-06-17 VITALS
OXYGEN SATURATION: 98 % | WEIGHT: 246 LBS | HEART RATE: 97 BPM | BODY MASS INDEX: 43.59 KG/M2 | SYSTOLIC BLOOD PRESSURE: 146 MMHG | DIASTOLIC BLOOD PRESSURE: 85 MMHG | HEIGHT: 63 IN | RESPIRATION RATE: 18 BRPM

## 2024-06-17 DIAGNOSIS — T63.431A CATERPILLAR STING: Primary | ICD-10-CM

## 2024-06-17 DIAGNOSIS — Z02.6 ENCOUNTER RELATED TO WORKER'S COMPENSATION CLAIM: ICD-10-CM

## 2024-06-17 LAB
CTP QC/QA: YES
POC 5 PANEL DRUG SCREEN: NEGATIVE

## 2024-06-17 PROCEDURE — 99204 OFFICE O/P NEW MOD 45 MIN: CPT | Mod: S$GLB,,, | Performed by: NURSE PRACTITIONER

## 2024-06-17 PROCEDURE — 80305 DRUG TEST PRSMV DIR OPT OBS: CPT | Mod: S$GLB,,, | Performed by: NURSE PRACTITIONER

## 2024-06-17 RX ORDER — DIPHENHYDRAMINE HCL 50 MG
50 CAPSULE ORAL NIGHTLY PRN
COMMUNITY
Start: 2024-06-17

## 2024-06-17 RX ORDER — LORATADINE 10 MG/1
10 TABLET ORAL DAILY
COMMUNITY
Start: 2024-06-17 | End: 2024-06-22

## 2024-06-17 NOTE — LETTER
[facility]  [address] [place] [address]  Phone: [phone]  Fax: [phone]  [facility]: [id number]    Pt Name: Amalia Hayes  Injury Date: 06/17/2024   Employee ID: [id number] Date of First Treatment: 06/17/2024   Company: [employer]      Appointment Time: 02:45 PM Arrived: 3:58pm   Provider: Alisha Saldaña NP Time Out:5:25 pm     Office Treatment:   1. [employer] sting    2. Encounter related to worker's compensation claim      Medications Ordered This Encounter   Medications    diphenhydrAMINE (BENADRYL) 50 MG capsule    loratadine (CLARITIN) 10 mg tablet      Patient Instructions: Attention not to aggravate affected area, Elevated affected area (Ice to affected area 10-15 minutes several times a day.)    Restrictions: Regular Duty, Home today     Return Appointment: 6/19/2024 at 3:45 pm

## 2024-06-17 NOTE — PROGRESS NOTES
"Subjective:      Patient ID: Amalia Hayes is a 33 y.o. female.    Chief Complaint: Insect Bite    Patient's place of employment - [employer]   Patient's job title - Pool Tech  Date of injury - 06/17/2024  Body part injured including left or right - Left Knee  Injury Mechanism - Insect Bite  What they were doing when they got hurt - Pt was on the ground and a caterpillar stung the left knee.   What they did immediately after - Went to get the first aid kit and wiped the area with an alcohol wipe.   Pain scale right now - 10/10  SB.    Pt states that she has never been stung by a caterpillar before.  Pt is having numbness in the left knee, feeling nauseated, and dizzy feeling.       Patient was stung on her left knee by [employer] today.  Initially experience nausea and dizziness but they are now subsiding.  Also having a lot of pain to the left knee where she was stung.  She cleaned the area with alcohol but did not take any analgesics nor did she apply any ice.  Denies shortness of breath, wheezing or any feeling of swelling in her throat. MWT        Musculoskeletal:  Negative for joint pain, joint swelling and abnormal ROM of joint ("when I move it, it burns").   Skin:  Positive for color change and erythema. Negative for puncture wound and bruising.   Allergic/Immunologic: Negative for itching.   Neurological:  Positive for dizziness, headaches, numbness and tingling.     Objective:     Physical Exam  Vitals and nursing note reviewed.   Constitutional:       Appearance: Normal appearance. She is obese.   HENT:      Right Ear: External ear normal.      Left Ear: External ear normal.   Eyes:      Conjunctiva/sclera: Conjunctivae normal.   Cardiovascular:      Rate and Rhythm: Normal rate and regular rhythm.      Heart sounds: Normal heart sounds.   Pulmonary:      Effort: Pulmonary effort is normal.      Breath sounds: Normal breath sounds.   Musculoskeletal:         General: Tenderness " present. No swelling or deformity.      Left knee: Erythema present. No swelling, deformity, ecchymosis or lacerations. Normal range of motion. Tenderness present.        Legs:       Comments: Localized erythema and heat just distal to left knee.  No significant swelling or ecchymosis.  No wound seen.  Full range of motion left knee.  Ambulating well with some pain.   Skin:     Findings: Erythema present. No bruising.   Neurological:      General: No focal deficit present.      Mental Status: She is alert and oriented to person, place, and time.   Psychiatric:         Mood and Affect: Mood normal.         Behavior: Behavior normal.         Thought Content: Thought content normal.         Judgment: Judgment normal.        Assessment:      1. [employer] sting    2. Encounter related to worker's compensation claim      Plan:   Patient has a localized reaction to [employer] sting.  Discussed taking OTC antihistamines: Benadryl at nighttime and loratadine during the day.  She also may take over-the-counter ibuprofen as needed for pain and inflammation.  She will apply ice as directed. Elastic bandage given to secure the ice pack.   Emergency precautions given if she starts to experience any respiratory symptoms.  She will return to clinic in 2 days for recheck.  If condition worsen she may return to clinic earlier.  Offered Toradol shot for the pain but patient declined.  Td is UTD.    Medications Ordered This Encounter   Medications    diphenhydrAMINE (BENADRYL) 50 MG capsule     Sig: Take 1 capsule (50 mg total) by mouth nightly as needed.    loratadine (CLARITIN) 10 mg tablet     Sig: Take 1 tablet (10 mg total) by mouth once daily. for 5 days     Patient Instructions: Attention not to aggravate affected area, Elevated affected area (Ice to affected area 10-15 minutes several times a day.)   Restrictions: Regular Duty, Home today  Follow up in about 2 days (around 6/19/2024).    I spent a total of 30 minutes on the  day of the visit.This includes face to face time and non-face to face time preparing to see the patient (eg, review of tests), obtaining and/or reviewing separately obtained history, documenting clinical information in the electronic or other health record, independently interpreting results and communicating results to the patient/family/caregiver, or care coordinator.

## 2024-06-19 ENCOUNTER — OFFICE VISIT (OUTPATIENT)
Dept: URGENT CARE | Facility: CLINIC | Age: 34
End: 2024-06-19
Payer: COMMERCIAL

## 2024-06-19 VITALS
RESPIRATION RATE: 20 BRPM | TEMPERATURE: 99 F | DIASTOLIC BLOOD PRESSURE: 85 MMHG | WEIGHT: 246 LBS | HEART RATE: 84 BPM | BODY MASS INDEX: 43.59 KG/M2 | OXYGEN SATURATION: 100 % | SYSTOLIC BLOOD PRESSURE: 127 MMHG | HEIGHT: 63 IN

## 2024-06-19 DIAGNOSIS — Z02.6 ENCOUNTER RELATED TO WORKER'S COMPENSATION CLAIM: ICD-10-CM

## 2024-06-19 DIAGNOSIS — T63.431A CATERPILLAR STING: Primary | ICD-10-CM

## 2024-06-19 PROCEDURE — 99213 OFFICE O/P EST LOW 20 MIN: CPT | Mod: S$GLB,,, | Performed by: SURGERY

## 2024-06-19 NOTE — LETTER
[facility]  [address] [address]  Phone: [phone]  Fax: [phone]  [facility]: [id number]    Pt Name: Amalia Hayes  Injury Date: 06/17/2024   Employee ID: [id number] Date of Treatment: 06/19/2024   Company: [employer]      Appointment Time: 03:45 PM Arrived: 3:43 PM    Provider: Haley Mccauley MD Time Out:4:14 PM      Office Treatment:   1. [employer] sting    2. Encounter related to worker's compensation claim               Restrictions: Regular Duty, Discharged from Occupational Health     Return as needed. SHERITA

## 2024-06-19 NOTE — PROGRESS NOTES
Subjective:      Patient ID: Amalia Hayes is a 33 y.o. female.    Chief Complaint: Leg Injury (Left)    Patient's place of employment - [employer]   Patient's job title - Pool Tech  Date of injury - 6/17/24  Body part injured including left or right - Left Knee  Current work status per last visit - Regular   Improved, same, or worse - Improved   Pain scale right now - 0/10    EC            Musculoskeletal:  Negative for abnormal ROM of joint, muscle cramps and muscle ache.   Skin:  Positive for erythema. Negative for color change, wound and bruising.   Neurological:  Negative for numbness and tingling.   Psychiatric/Behavioral:  Negative for sleep disturbance.        See MA note above. Begin MD note:    Amalia Hayes is a 33 y.o. presenting for follow-up of caterpillar bite. She reports that she purchased the medication and used as directed. She had some tingling yesterday but today there is no pain or altered sensation. She feels able to continue working regularly and be discharged from [facility].    Objective:     Physical Exam  Vitals and nursing note reviewed.   Constitutional:       Appearance: Normal appearance. She is morbidly obese.   HENT:      Right Ear: External ear normal.      Left Ear: External ear normal.   Eyes:      Conjunctiva/sclera: Conjunctivae normal.   Cardiovascular:      Rate and Rhythm: Normal rate and regular rhythm.      Heart sounds: Normal heart sounds.   Pulmonary:      Effort: Pulmonary effort is normal.      Breath sounds: Normal breath sounds.   Musculoskeletal:         General: No swelling, tenderness or deformity.      Left knee: Erythema present. No swelling, deformity, ecchymosis or lacerations. Normal range of motion.        Legs:       Comments: Localized erythema and scabbing.  No significant swelling or ecchymosis.  Full range of motion left knee. Ambulating without pain.    Skin:     Findings: Erythema present. No bruising.   Neurological:       General: No focal deficit present.      Mental Status: She is alert and oriented to person, place, and time.   Psychiatric:         Mood and Affect: Mood normal.         Behavior: Behavior normal.         Thought Content: Thought content normal.         Judgment: Judgment normal.              Assessment:      1. Caterpillar sting    2. Encounter related to worker's compensation claim      Plan:     I reviewed the prior clinic note related to this injury. Resolution of pain and altered sensation symptoms. Regular Duty. Discharge from [facility].         Diagnoses and plan discussed with the patient, all questions and concerns were addressed prior to discharge. Follow-up as needed if any reoccurrence of symptoms related to the present condition. Plan was developed with active input from the patient and they verbalized understanding of and agreement with the POC.   Note was dictated with voice recognition software, please excuse any grammatical errors.    I spent a total of 25 minutes on the day of the visit.  This includes face to face time and non-face to face time preparing to see the patient (eg, review of tests), obtaining and/or reviewing separately obtained history, documenting clinical information in the electronic or other health record, independently interpreting results and communicating results to the patient/family/caregiver, or care coordinator.     Restrictions: Regular Duty, Discharged from Occupational Health  Follow up if symptoms worsen or fail to improve.

## 2024-09-18 ENCOUNTER — OFFICE VISIT (OUTPATIENT)
Dept: DERMATOLOGY | Facility: CLINIC | Age: 34
End: 2024-09-18
Payer: COMMERCIAL

## 2024-09-18 ENCOUNTER — LAB VISIT (OUTPATIENT)
Dept: LAB | Facility: HOSPITAL | Age: 34
End: 2024-09-18
Attending: STUDENT IN AN ORGANIZED HEALTH CARE EDUCATION/TRAINING PROGRAM
Payer: COMMERCIAL

## 2024-09-18 DIAGNOSIS — L56.4 POLYMORPHIC LIGHT ERUPTION: ICD-10-CM

## 2024-09-18 DIAGNOSIS — R21 RASH: ICD-10-CM

## 2024-09-18 DIAGNOSIS — R21 RASH: Primary | ICD-10-CM

## 2024-09-18 PROCEDURE — 1159F MED LIST DOCD IN RCRD: CPT | Mod: CPTII,S$GLB,, | Performed by: STUDENT IN AN ORGANIZED HEALTH CARE EDUCATION/TRAINING PROGRAM

## 2024-09-18 PROCEDURE — 11104 PUNCH BX SKIN SINGLE LESION: CPT | Mod: S$GLB,,, | Performed by: STUDENT IN AN ORGANIZED HEALTH CARE EDUCATION/TRAINING PROGRAM

## 2024-09-18 PROCEDURE — 36415 COLL VENOUS BLD VENIPUNCTURE: CPT | Performed by: STUDENT IN AN ORGANIZED HEALTH CARE EDUCATION/TRAINING PROGRAM

## 2024-09-18 PROCEDURE — 1160F RVW MEDS BY RX/DR IN RCRD: CPT | Mod: CPTII,S$GLB,, | Performed by: STUDENT IN AN ORGANIZED HEALTH CARE EDUCATION/TRAINING PROGRAM

## 2024-09-18 PROCEDURE — 86038 ANTINUCLEAR ANTIBODIES: CPT | Performed by: STUDENT IN AN ORGANIZED HEALTH CARE EDUCATION/TRAINING PROGRAM

## 2024-09-18 PROCEDURE — 99214 OFFICE O/P EST MOD 30 MIN: CPT | Mod: 25,S$GLB,, | Performed by: STUDENT IN AN ORGANIZED HEALTH CARE EDUCATION/TRAINING PROGRAM

## 2024-09-18 PROCEDURE — 99999 PR PBB SHADOW E&M-EST. PATIENT-LVL III: CPT | Mod: PBBFAC,,, | Performed by: STUDENT IN AN ORGANIZED HEALTH CARE EDUCATION/TRAINING PROGRAM

## 2024-09-18 RX ORDER — CLOBETASOL PROPIONATE 0.5 MG/G
CREAM TOPICAL 2 TIMES DAILY
Qty: 60 G | Refills: 2 | Status: SHIPPED | OUTPATIENT
Start: 2024-09-18 | End: 2024-09-18

## 2024-09-18 RX ORDER — TRIAMCINOLONE ACETONIDE 0.25 MG/G
CREAM TOPICAL 2 TIMES DAILY
Qty: 80 G | Refills: 1 | Status: SHIPPED | OUTPATIENT
Start: 2024-09-18

## 2024-09-18 RX ORDER — CLOBETASOL PROPIONATE 0.5 MG/G
OINTMENT TOPICAL 2 TIMES DAILY
Qty: 60 G | Refills: 2 | Status: SHIPPED | OUTPATIENT
Start: 2024-09-18

## 2024-09-18 NOTE — PATIENT INSTRUCTIONS
Supplement to take: Heliocare    Sunscreen Guidelines  Sunscreen protects your skin by absorbing and reflecting ultraviolet rays. All sunscreens have a sun protection factor (SPF) rating that indicates how long a sunscreen will remain effective on the skin.    Why protect your skin?  The sun's rays are composed of many different wavelengths, including UVA, UVB, and visible light that each affect the skin differently.    UVB: sunburn, photoaging, skin cancer (melanoma, basal cell, and squamous cell carcinomas) and modulation of the skin's immune system.    UVA: similar to above but thought to contribute more to aging; at the same dose of UVB it is less powerful however the sun has 10-20 times the levels of UVA as compared with UVB.  Visible light: implicated in causing unwanted darkening of skin, such as melasma and post-inflammatory hyperpigmentation in darker skin types     If I have dark skin, do I need to worry about the sun?    More darkly pigmented skin is more protected against UV-induced skin cancer, sunburn, and photoaging, though may still suffer from sun-related conditions, including melasma, hyperpigmentation, and other dark spots.    Sun avoidance  As a general rule, stay out of the sun as much as possible between 10 a.m. - 4 p.m.    Download the EPA UV index maile to track the UV index by hour in your zip code.      Which sunscreen should I choose?  The best sunscreen to use varies by individual. The one that feels best on your skin and fits your lifestyle will be the one you will likely use most regularly.   Active ingredients of sunscreen vary by , and may be a chemical (such as avobenzone or oxybenzone) or physical agent (such as zinc oxide or titanium dioxide). I recommend a physical agent.  A water-resistant sunscreen is one that maintains the SPF level after 40 minutes of water exposure. A very water-resistant sunscreen maintains the SPF level after 80 minutes of water  "exposure.    Sunscreen: this is the last layer in sun protection   Be generous: 1 shot glass of sunscreen for your body, ½ teaspoon for your face/neck  Reapply every 2 hours  Broad spectrum (provides UVA/UVB protection), SPF 50 or above  Avoid spray sunscreens: less effective and have been found to contain benzene (carcinogen)    Sun protective clothing  Although sunscreen helps minimize sun damage, no sunscreen completely blocks all wavelengths of UV light. Wearing sun protective clothing such as hats, rashguards or swim shirts, and long sleeves and/or pants, as well as avoiding sun exposure from 10 a.m. to 4 p.m. will help protect your skin from overexposure and minimize sun damage. Seek shade.  Long sleeved clothing, hats, and sunglasses: makes sun protection easier, more effective, and can even be more affordable, since sunscreen needs to be reapplied frequently.    Solumbra (www.sunprectautions.com)  Adviqo (www.[employer].)  Coolibar (Rocketick.OpenFeint)  Facios Healthpointz (wwwMobilePeak)  Hats from [employer] (www.helenkaminski.com)    My Favorite Sunscreens:  Physical blockers: Can have a "white case" but in general are more effective  - Face: CeraVe tinted mineral sunscreen, Bare Minerals complexion rescue (20 shades), Elta MD (UV elements, UV physical, UV restore, etc), Tizo ultra zinc tinted, Cetaphil Sheer Mineral Face Liquid Sunscreen  - Body: Blue Lizard, Neutrogena Sheer Zinc, Eucerin Daily Protection, Aveeno Baby        Punch Biopsy Wound Care    Your doctor has performed a punch biopsy today.  A band aid and antibiotic ointment has been placed over the site.  This should remain in place for 24 hours.  It is recommended that you keep the area dry for the first 24 hours.  After 24 hours, you may remove the band aid and wash the area with warm soap and water and apply Vaseline jelly.  Many patients prefer to use Neosporin or Bacitracin ointment.  This is acceptable; however know that you can " develop an allergy to this medication even if you have used it safely for years.  It is important to keep the area moist.  Letting it dry out and get air slows healing time, will worsen the scar, and make it more difficult to remove the stitches if they were placed.  Band aid is optional after first 24 hours.      If you notice increasing redness, tenderness, pain, or yellow drainage at the biopsy or surgical site, please notify your doctor.  These are signs of an infection.    If your biopsy/surgical site is bleeding, apply firm pressure for 15 minutes straight.  Repeat for another 15 minutes, if it is still bleeding.   If the surgical site continues to bleed, then please contact your doctor.      For [facility] users:   You will receive your biopsy results in [facility] as soon as they are available. Please be assured that your physician/provider will review your results and will then determine what further treatment, evaluation, or planning is required. You should be contacted by your physician's/provider's office within 5 business days of receiving your results; If not, please reach out to directly. This is one more way [facility] is putting you first.       [facility] [place] [address]/ [phone] FAX/ [email or web address]

## 2024-09-18 NOTE — PROGRESS NOTES
Subjective:      Patient ID:  Amalia Hayes is a 34 y.o. female who presents for   Chief Complaint   Patient presents with    Allergic Reaction     Arms, lower legs, scalp     Allergic Reaction - Initial  Affected locations: neck, scalp, left lower leg, right lower leg, left arm and right arm  Duration: 2 months  Signs / symptoms: itching, scabs and dryness  Aggravated by: sunlight and heat  Relieving factors/Treatments tried: OTC moisturizer (Tar shampoo)  Improvement on treatment: no relief      Last seen for pityriasis rosea 12/2022    New rash on dorsal forearms, legs, posterior neck. A/w itching. 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       Objective:   Physical Exam   Constitutional: She appears well-developed and well-nourished. No distress.   Neurological: She is alert and oriented to person, place, and time. She is not disoriented.   Psychiatric: She has a normal mood and affect.   Skin:   Areas Examined (abnormalities noted in diagram):   Scalp / Hair Palpated and Inspected  Neck Inspection Performed  RUE Inspected  LUE Inspection Performed  RLE Inspected  LLE Inspection Performed            Diagram Legend     Erythematous scaling macule/papule c/w actinic keratosis       Vascular papule c/w angioma      Pigmented verrucoid papule/plaque c/w seborrheic keratosis      Yellow umbilicated papule c/w sebaceous hyperplasia      Irregularly shaped tan macule c/w lentigo     1-2 mm smooth white papules consistent with Milia      Movable subcutaneous cyst with punctum c/w epidermal inclusion cyst      Subcutaneous movable cyst c/w pilar cyst      Firm pink to brown papule c/w dermatofibroma      Pedunculated fleshy papule(s) c/w skin tag(s)      Evenly pigmented macule c/w junctional nevus     Mildly variegated pigmented, slightly irregular-bordered  macule c/w mildly atypical nevus      Flesh colored to evenly pigmented papule c/w intradermal nevus       Pink pearly papule/plaque c/w basal cell carcinoma      Erythematous hyperkeratotic cursted plaque c/w SCC      Surgical scar with no sign of skin cancer recurrence      Open and closed comedones      Inflammatory papules and pustules      Verrucoid papule consistent consistent with wart     Erythematous eczematous patches and plaques     Dystrophic onycholytic nail with subungual debris c/w onychomycosis     Umbilicated papule    Erythematous-base heme-crusted tan verrucoid plaque consistent with inflamed seborrheic keratosis     Erythematous Silvery Scaling Plaque c/w Psoriasis     See annotation      Assessment / Plan:      Pathology Orders:       Normal Orders This Visit    Specimen to Pathology, Dermatology     Questions:    Procedure Type: Dermatology and skin neoplasms    Number of Specimens: 1    ------------------------: -------------------------    Spec 1 Procedure: Biopsy    Spec 1 Clinical Impression: PMLE vs lupus    Spec 1 Source: right forearm    Release to patient: Immediate    Release to patient:           Rash  -     DAIN Screen w/Reflex; Future; Expected date: 09/18/2024    Punch biopsy procedure note:  Punch biopsy performed after verbal consent obtained. Area marked and prepped with alcohol. Approximately 1cc of 1% lidocaine with epinephrine injected. 3 mm disposable punch used to remove lesion. Hemostasis obtained and biopsy site closed with 1 - 2 Prolene sutures. Wound care instructions reviewed with patient and handout given.    Polymorphic light eruption  -     triamcinolone acetonide 0.025% (KENALOG) 0.025 % cream; Apply topically 2 (two) times daily. Use to affected areas for up to 2 weeks then take a 1 week break or decrease to 3 times weekly. Do not apply to groin or face. Use to rash on neck when needed  Dispense: 80 g; Refill: 1  -     clobetasol 0.05% (TEMOVATE) 0.05 % Oint; Apply  topically 2 (two) times daily. Apply topically 2 (two) times daily. Use to affected areas for up to 2 weeks then take a 1 week break or decrease to 3 times weekly. Do not apply to groin or face. Use to rash on arms and legs  Dispense: 60 g; Refill: 2    - photodistributed eruption on dorsal forearms, dorsal neck, thigh. Favor PMLE. ROS negative for SLE but will get biopsy and DAIN to r/o  - counseled on photoprotection--UPF sleeves or mineral sunscreen when outside  - can take OTC heliocare  - clobetasol prn for arms and legs  - TAC 0.025% for neck prn       Follow up in about 2 weeks (around 10/2/2024) for Suture removal.

## 2024-09-19 LAB — ANA SER QL IF: NORMAL

## 2024-10-16 ENCOUNTER — TELEPHONE (OUTPATIENT)
Dept: DERMATOLOGY | Facility: CLINIC | Age: 34
End: 2024-10-16
Payer: COMMERCIAL

## 2024-10-17 ENCOUNTER — TELEPHONE (OUTPATIENT)
Dept: DERMATOLOGY | Facility: CLINIC | Age: 34
End: 2024-10-17
Payer: COMMERCIAL

## 2024-10-25 ENCOUNTER — PATIENT MESSAGE (OUTPATIENT)
Dept: DERMATOLOGY | Facility: CLINIC | Age: 34
End: 2024-10-25
Payer: COMMERCIAL

## 2024-10-25 ENCOUNTER — TELEPHONE (OUTPATIENT)
Dept: DERMATOLOGY | Facility: CLINIC | Age: 34
End: 2024-10-25
Payer: COMMERCIAL

## 2024-11-01 ENCOUNTER — TELEPHONE (OUTPATIENT)
Dept: DERMATOLOGY | Facility: CLINIC | Age: 34
End: 2024-11-01
Payer: COMMERCIAL

## 2024-12-02 ENCOUNTER — OFFICE VISIT (OUTPATIENT)
Dept: DERMATOLOGY | Facility: CLINIC | Age: 34
End: 2024-12-02
Payer: COMMERCIAL

## 2024-12-02 DIAGNOSIS — L93.2 CUTANEOUS LUPUS ERYTHEMATOSUS: Primary | ICD-10-CM

## 2024-12-02 PROCEDURE — 99213 OFFICE O/P EST LOW 20 MIN: CPT | Mod: S$GLB,,, | Performed by: STUDENT IN AN ORGANIZED HEALTH CARE EDUCATION/TRAINING PROGRAM

## 2024-12-02 PROCEDURE — 1160F RVW MEDS BY RX/DR IN RCRD: CPT | Mod: CPTII,S$GLB,, | Performed by: STUDENT IN AN ORGANIZED HEALTH CARE EDUCATION/TRAINING PROGRAM

## 2024-12-02 PROCEDURE — 99999 PR PBB SHADOW E&M-EST. PATIENT-LVL II: CPT | Mod: PBBFAC,,, | Performed by: STUDENT IN AN ORGANIZED HEALTH CARE EDUCATION/TRAINING PROGRAM

## 2024-12-02 PROCEDURE — 1159F MED LIST DOCD IN RCRD: CPT | Mod: CPTII,S$GLB,, | Performed by: STUDENT IN AN ORGANIZED HEALTH CARE EDUCATION/TRAINING PROGRAM

## 2024-12-02 PROCEDURE — G2211 COMPLEX E/M VISIT ADD ON: HCPCS | Mod: S$GLB,,, | Performed by: STUDENT IN AN ORGANIZED HEALTH CARE EDUCATION/TRAINING PROGRAM

## 2024-12-02 NOTE — PROGRESS NOTES
Subjective:      Patient ID:  Amalia Hayes is a 34 y.o. female who presents for   Chief Complaint   Patient presents with    Rash     Arms      Pt states TAC cream or clobetasol did not help     Allergic Reaction - Follow-up  Symptom course: stable  Affected locations: neck, scalp, left lower leg, right lower leg, left arm and right arm  Duration: 2 months  Signs / symptoms: itching, scabs and dryness  Signs / symptoms: itching  Severity: mild to moderate  Aggravated by: sunlight and heat  Relieving factors/Treatments tried: OTC moisturizer (Tar shampoo)  Improvement on treatment: no relief      Seen 9/2024 for New rash on dorsal forearms, legs, posterior neck. A/w itching. 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 She reports she was born with osteoarthritis. No joint pains elsewhere    Ddx was PMLE vs cutaneous lupus. Bx as below    1. Skin, right forearm, punch biopsy:   - VACUOLAR INTERFACE DERMATITIS WITH MILD SPONGIOSIS AND INCREASED DERMAL MUCIN (SEE COMMENT).     COMMENT: This histology is suggestive of subacute cutaneous lupus or an alternative connective tissue disease process    DAIN 9/2024 was negative    Rash overall improving. Not itching  Using TAC 0.025% for neck prn and clobetasol prn to body    Review of Systems   Skin:  Positive for itching.       Objective:   Physical Exam   Constitutional: She appears well-developed and well-nourished. No distress.   Neurological: She is alert and oriented to person, place, and time. She is not disoriented.   Psychiatric: She has a normal mood and affect.   Skin:   Areas Examined (abnormalities noted in diagram):   Scalp / Hair Palpated and Inspected  Neck Inspection Performed  RUE Inspected  LUE Inspection Performed  RLE Inspected  LLE Inspection Performed            Diagram Legend     Erythematous scaling macule/papule c/w  actinic keratosis       Vascular papule c/w angioma      Pigmented verrucoid papule/plaque c/w seborrheic keratosis      Yellow umbilicated papule c/w sebaceous hyperplasia      Irregularly shaped tan macule c/w lentigo     1-2 mm smooth white papules consistent with Milia      Movable subcutaneous cyst with punctum c/w epidermal inclusion cyst      Subcutaneous movable cyst c/w pilar cyst      Firm pink to brown papule c/w dermatofibroma      Pedunculated fleshy papule(s) c/w skin tag(s)      Evenly pigmented macule c/w junctional nevus     Mildly variegated pigmented, slightly irregular-bordered macule c/w mildly atypical nevus      Flesh colored to evenly pigmented papule c/w intradermal nevus       Pink pearly papule/plaque c/w basal cell carcinoma      Erythematous hyperkeratotic cursted plaque c/w SCC      Surgical scar with no sign of skin cancer recurrence      Open and closed comedones      Inflammatory papules and pustules      Verrucoid papule consistent consistent with wart     Erythematous eczematous patches and plaques     Dystrophic onycholytic nail with subungual debris c/w onychomycosis     Umbilicated papule    Erythematous-base heme-crusted tan verrucoid plaque consistent with inflamed seborrheic keratosis     Erythematous Silvery Scaling Plaque c/w Psoriasis     See annotation      Assessment / Plan:        Cutaneous lupus erythematosus    - photodistributed eruption on dorsal forearms, dorsal neck, thigh. Clinically favored PMLE but biopsy was more suggestive of cutaneous lupus or connective tissue disease. DAIN is negative 9/2024. ROS negative for SLE. No other cutaneous findings of connective tissue disease (eg, malar rash, raynauds, cuticular changes, shawl sign, etc). Unclear what category of cutaneous connective tissue disease it would be--no SLE so not acute CL, not typical for SCLE, DLE, dermatomyositis, etc  - rash relatively well controlled with photoprotection and topicals. No need for  plaquenil at this time  - will monitor annually with ROS, exam and DAIN as sometimes cutaneous lupus can proceed development of SLE. F/u with PCP if any of the ROS become positive  - counseled on photoprotection--UPF sleeves or mineral sunscreen when outside  - can take OTC heliocare  - clobetasol prn for arms and legs  - TAC 0.025% for neck prn         Follow up in about 1 year (around 12/2/2025).